# Patient Record
Sex: FEMALE | Race: AMERICAN INDIAN OR ALASKA NATIVE | Employment: UNEMPLOYED | URBAN - METROPOLITAN AREA
[De-identification: names, ages, dates, MRNs, and addresses within clinical notes are randomized per-mention and may not be internally consistent; named-entity substitution may affect disease eponyms.]

---

## 2017-02-14 ENCOUNTER — HOSPITAL ENCOUNTER (EMERGENCY)
Facility: CLINIC | Age: 52
Discharge: HOME | End: 2017-02-15
Attending: EMERGENCY MEDICINE | Admitting: EMERGENCY MEDICINE
Payer: COMMERCIAL

## 2017-02-14 ENCOUNTER — APPOINTMENT (OUTPATIENT)
Dept: CT IMAGING | Facility: CLINIC | Age: 52
End: 2017-02-14
Attending: EMERGENCY MEDICINE
Payer: COMMERCIAL

## 2017-02-14 DIAGNOSIS — F10.120 ALCOHOL ABUSE WITH UNCOMPLICATED INTOXICATION (H): ICD-10-CM

## 2017-02-14 DIAGNOSIS — E87.6 HYPOKALEMIA: ICD-10-CM

## 2017-02-14 DIAGNOSIS — E83.42 HYPOMAGNESEMIA: ICD-10-CM

## 2017-02-14 DIAGNOSIS — F10.10 ALCOHOL ABUSE: ICD-10-CM

## 2017-02-14 LAB — ALCOHOL BREATH TEST: 0.36 (ref 0–0.01)

## 2017-02-14 PROCEDURE — 83735 ASSAY OF MAGNESIUM: CPT | Performed by: EMERGENCY MEDICINE

## 2017-02-14 PROCEDURE — 85025 COMPLETE CBC W/AUTO DIFF WBC: CPT | Performed by: EMERGENCY MEDICINE

## 2017-02-14 PROCEDURE — 82075 ASSAY OF BREATH ETHANOL: CPT

## 2017-02-14 PROCEDURE — 70450 CT HEAD/BRAIN W/O DYE: CPT

## 2017-02-14 PROCEDURE — 99285 EMERGENCY DEPT VISIT HI MDM: CPT | Mod: 25

## 2017-02-14 PROCEDURE — 80048 BASIC METABOLIC PNL TOTAL CA: CPT | Performed by: EMERGENCY MEDICINE

## 2017-02-14 PROCEDURE — 99284 EMERGENCY DEPT VISIT MOD MDM: CPT | Mod: Z6 | Performed by: EMERGENCY MEDICINE

## 2017-02-14 PROCEDURE — 96361 HYDRATE IV INFUSION ADD-ON: CPT | Mod: 59

## 2017-02-14 PROCEDURE — 25000128 H RX IP 250 OP 636: Performed by: EMERGENCY MEDICINE

## 2017-02-14 RX ADMIN — SODIUM CHLORIDE 1000 ML: 9 INJECTION, SOLUTION INTRAVENOUS at 23:54

## 2017-02-14 ASSESSMENT — ENCOUNTER SYMPTOMS
ABDOMINAL PAIN: 0
HEADACHES: 0
NECK STIFFNESS: 0
CONFUSION: 0
COLOR CHANGE: 0
ARTHRALGIAS: 0
FEVER: 0
DIFFICULTY URINATING: 0
EYE REDNESS: 0
SHORTNESS OF BREATH: 0

## 2017-02-14 NOTE — ED AVS SNAPSHOT
[facility], [place], Emergency Department    [address]    [facility] [address]    Phone:  [phone]    Fax:  [phone]                                       Kari Mackay   MRN: [id number]    Department:  [facility], Emergency Department   Date of Visit:  2/14/2017           After Visit Summary Signature Page     I have received my discharge instructions, and my questions have been answered. I have discussed any challenges I see with this plan with the nurse or doctor.    ..........................................................................................................................................  Patient/Patient Representative Signature      ..........................................................................................................................................  Patient Representative Print Name and Relationship to Patient    ..................................................               ................................................  Date                                            Time    ..........................................................................................................................................  Reviewed by Signature/Title    ...................................................              ..............................................  Date                                                            Time

## 2017-02-14 NOTE — ED AVS SNAPSHOT
" [facility], Emergency Department    [address]    [place] [address]    Phone:  [phone]    Fax:  [phone]                                       Kari Mackay   MRN: [id number]    Department:  [facility], Emergency Department   Date of Visit:  2/14/2017           Patient Information     Date Of Birth          1965        Your diagnoses for this visit were:     Alcohol abuse     Hypomagnesemia     Hypokalemia        You were seen by Shabbir Wooten MD.        Discharge Instructions         Alcohol Abuse  Alcoholic drinks are harmful when you have too many of them. There is no set number of drinks that defines too much. Drinking that disrupts your life or your health is called alcohol abuse. Alcohol abuse can hurt your relationships with others. You may lose friends, a spouse, or even your job. You may be abusing alcohol if any of the following are true for you:    Duties at home or with  suffer because of drinking.    Duties at work or in school suffer because of drinking.    You have missed work or school because of drinking.    You use alcohol while driving or operating machinery.    You have legal problems such as arrests due to drinking.    You keep drinking even though it causes serious problems in your life.  Health effects  Alcohol abuse causes health problems. Sometimes this can happen after only drinking a  little.\" There is no set number of drinks or amount of alcohol that defines too much. The more you drink at one time, and the more often you drink determine both the short-term and long-term health effects. It affects all parts of your body and your health, including your:    Brain. Alcohol is a central nervous system depressant. It can damage parts of the brain that affect your balance, memory, thinking, and emotions. It can cause memory loss, blackouts, depression, agitation, sleep cycle changes, and seizures. These changes may or may not be reversible.    Heart " and vascular system. Alcohol affects multiple areas. It can damage heart muscle causing cardiomyopathy, which is a weakening and stretching of the heart muscle. This can lead to trouble breathing, an irregular heartbeat, atrial fibrillation, leg swelling, and heart failure. Alcohol use makes the blood vessels stiffen causing hypertension (high blood pressure). All of these problems increase your risk of having heart attacks or strokes.    Liver. Alcohol causes fat to build up in the liver, affecting its normal function. This increases the risk for hepatitis, leading to abdominal pain, appetite loss, jaundice, bleeding problems, liver fibrosis, and cirrhosis. This, in turn, can affect your ability to fight off infections, and can cause diabetes. The liver changes prevent it from removing toxins in your blood that can cause encephalopathy which may show with confusion, altered level of consciousness, personality changes, memory loss, seizures, coma, and death.    Pancreas. Alcohol can cause inflammation of the pancreas, or pancreatitis. This can cause abdominal pain, fever, and diabetes.    Immune system. Alcohol weakens your immune system in a number of ways. It suppresses your immune system making it harder to fight infections and colds. It also increases the chance of getting pneumonia and tuberculosis.    Cancer. Alcohol is a risk factor for developing cancer of the mouth, esophagus, pharynx, larynx, liver, and breast.    Sexual function. Alcohol can lead to sexual problems.  Home care  The following guidelines will help you deal with alcohol abuse:    Admit you have a problem with alcohol.    Ask for help from your health care provider and trusted family members or close friends.    Get help from people trained in dealing with alcohol abuse. This may be individual counseling or group therapy, or it may be a supervised alcohol treatment program.    Join a self-help group for alcohol abuse such as Alcoholics  Anonymous (AA).    Avoid people who abuse alcohol or tempt you to drink.  Follow-up care  Follow up as advised by the doctor or our staff. Contact these groups to get help:    Alcoholics Anonymous (AA): Go to www.aa.org or check the phone book for meetings near you.    National Alcohol and Substance Abuse Information Center (NASAI): 103.908.3857 www.[employer]Guides.co    National [place] on Alcoholism and Drug Dependence (NCADD): 419-DOW-XKCQ (513-5223) www.ncadd.org    Al-Anon: 378-3SD-IKSL (070-1545) www.al-anon.org  Call 911  Call 911 if any of these occur:    Trouble breathing or slow irregular breathing    Chest pain    Sudden weakness on one side of your body or sudden trouble speaking    Heavy bleeding or vomiting blood    Very drowsy or trouble awakening    Fainting or loss of consciousness    Rapid heart rate    Seizure  When to seek medical care  Get prompt medical attention if you have:    Confusion    Hallucinations (seeing, hearing, or feeling things that aren t there)    Pain in your upper abdomen that gets worse    Repeated vomiting or black or tarry stools    Severe shakiness    [id number] [employer]. [address] [place] [address]. All rights reserved. This information is not intended as a substitute for professional medical care. Always follow your healthcare professional's instructions.    Please make an appointment to follow up with Your Primary Care Provider.       Discharge References/Attachments     DIET: HIGH POTASSIUM, DISCHARGE INSTRUCTIONS (ENGLISH)      24 Hour Appointment Hotline       To make an appointment at any [facility], call [phone] ([phone]). If you don't have a family doctor or clinic, we will help you find one. [facility] clinics are conveniently located to serve the needs of you and your family.             Review of your medicines      Our records show that you are taking the medicines listed below. If these are incorrect,  "please call your family doctor or clinic.        Dose / Directions Last dose taken    multivitamin, therapeutic with minerals Tabs tablet   Dose:  1 tablet   Quantity:  30 each        Take 1 tablet by mouth daily   Refills:  0                Procedures and tests performed during your visit     Alcohol breath test POCT    Basic metabolic panel    CBC with platelets differential    Head CT w/o contrast    ISTAT gases elec ica gluc buddy POCT    Magnesium      Orders Needing Specimen Collection     None      Pending Results     No orders found for last 3 day(s).            Pending Culture Results     No orders found for last 3 day(s).            Thank you for choosing [facility]       Thank you for choosing [facility] for your care. Our goal is always to provide you with excellent care. Hearing back from our patients is one way we can continue to improve our services. Please take a few minutes to complete the written survey that you may receive in the mail after you visit with us. Thank you!        [email or web address] Information     [employer] lets you send messages to your doctor, view your test results, renew your prescriptions, schedule appointments and more. To sign up, go to [email or web address] . Click on \"Log in\" on the left side of the screen, which will take you to the Welcome page. Then click on \"Sign up Now\" on the right side of the page.     You will be asked to enter the access code listed below, as well as some personal information. Please follow the directions to create your username and password.     Your access code is: [email or web address]  Expires: 2017  6:33 AM     Your access code will  in 90 days. If you need help or a new code, please call your [facility] clinic or [phone].        Care EveryWhere ID     This is your Care EveryWhere ID. This could be used by other organizations to access your [facility] medical records  [id number]        After Visit Summary       This is your record. Keep this with you and " show to your community pharmacist(s) and doctor(s) at your next visit.

## 2017-02-15 VITALS
OXYGEN SATURATION: 94 % | SYSTOLIC BLOOD PRESSURE: 104 MMHG | HEART RATE: 87 BPM | DIASTOLIC BLOOD PRESSURE: 70 MMHG | RESPIRATION RATE: 14 BRPM | TEMPERATURE: 98.3 F

## 2017-02-15 LAB
ANION GAP SERPL CALCULATED.3IONS-SCNC: 10 MMOL/L (ref 3–14)
BASOPHILS # BLD AUTO: 0.1 10E9/L (ref 0–0.2)
BASOPHILS NFR BLD AUTO: 1.1 %
BUN SERPL-MCNC: 11 MG/DL (ref 7–30)
CA-I BLD-SCNC: 3.9 MG/DL (ref 4.4–5.2)
CALCIUM SERPL-MCNC: 7.6 MG/DL (ref 8.5–10.1)
CHLORIDE SERPL-SCNC: 105 MMOL/L (ref 94–109)
CO2 BLDCOV-SCNC: 25 MMOL/L (ref 21–28)
CO2 SERPL-SCNC: 29 MMOL/L (ref 20–32)
CREAT SERPL-MCNC: 0.78 MG/DL (ref 0.52–1.04)
DIFFERENTIAL METHOD BLD: ABNORMAL
EOSINOPHIL # BLD AUTO: 0.1 10E9/L (ref 0–0.7)
EOSINOPHIL NFR BLD AUTO: 1.3 %
ERYTHROCYTE [DISTWIDTH] IN BLOOD BY AUTOMATED COUNT: 16.7 % (ref 10–15)
GFR SERPL CREATININE-BSD FRML MDRD: 78 ML/MIN/1.7M2
GLUCOSE BLD-MCNC: 96 MG/DL (ref 70–99)
GLUCOSE SERPL-MCNC: 81 MG/DL (ref 70–99)
HCT VFR BLD AUTO: 24.7 % (ref 35–47)
HCT VFR BLD CALC: 24 %PCV (ref 35–47)
HGB BLD CALC-MCNC: 8.2 G/DL (ref 11.7–15.7)
HGB BLD-MCNC: 8.2 G/DL (ref 11.7–15.7)
IMM GRANULOCYTES # BLD: 0.1 10E9/L (ref 0–0.4)
IMM GRANULOCYTES NFR BLD: 1.9 %
LYMPHOCYTES # BLD AUTO: 1.9 10E9/L (ref 0.8–5.3)
LYMPHOCYTES NFR BLD AUTO: 35.4 %
MAGNESIUM SERPL-MCNC: 1.5 MG/DL (ref 1.6–2.3)
MCH RBC QN AUTO: 32.4 PG (ref 26.5–33)
MCHC RBC AUTO-ENTMCNC: 33.2 G/DL (ref 31.5–36.5)
MCV RBC AUTO: 98 FL (ref 78–100)
MONOCYTES # BLD AUTO: 0.4 10E9/L (ref 0–1.3)
MONOCYTES NFR BLD AUTO: 7.1 %
NEUTROPHILS # BLD AUTO: 2.9 10E9/L (ref 1.6–8.3)
NEUTROPHILS NFR BLD AUTO: 53.2 %
NRBC # BLD AUTO: 0 10*3/UL
NRBC BLD AUTO-RTO: 1 /100
PCO2 BLDV: 43 MM HG (ref 40–50)
PH BLDV: 7.38 PH (ref 7.32–7.43)
PLATELET # BLD AUTO: 114 10E9/L (ref 150–450)
PO2 BLDV: 61 MM HG (ref 25–47)
POTASSIUM BLD-SCNC: 3.7 MMOL/L (ref 3.4–5.3)
POTASSIUM SERPL-SCNC: 3.2 MMOL/L (ref 3.4–5.3)
RBC # BLD AUTO: 2.53 10E12/L (ref 3.8–5.2)
SAO2 % BLDV FROM PO2: 90 %
SODIUM BLD-SCNC: 146 MMOL/L (ref 133–144)
SODIUM SERPL-SCNC: 144 MMOL/L (ref 133–144)
WBC # BLD AUTO: 5.4 10E9/L (ref 4–11)

## 2017-02-15 PROCEDURE — 40000498 ZZHCL STATISTIC POTASSIUM ED POCT

## 2017-02-15 PROCEDURE — 25000128 H RX IP 250 OP 636: Performed by: EMERGENCY MEDICINE

## 2017-02-15 PROCEDURE — 25000125 ZZHC RX 250: Performed by: EMERGENCY MEDICINE

## 2017-02-15 PROCEDURE — 96365 THER/PROPH/DIAG IV INF INIT: CPT

## 2017-02-15 PROCEDURE — 82803 BLOOD GASES ANY COMBINATION: CPT

## 2017-02-15 PROCEDURE — 82330 ASSAY OF CALCIUM: CPT

## 2017-02-15 PROCEDURE — 40000501 ZZHCL STATISTIC HEMATOCRIT ED POCT

## 2017-02-15 PROCEDURE — 40000497 ZZHCL STATISTIC SODIUM ED POCT

## 2017-02-15 PROCEDURE — 40000502 ZZHCL STATISTIC GLUCOSE ED POCT

## 2017-02-15 PROCEDURE — 25000132 ZZH RX MED GY IP 250 OP 250 PS 637: Performed by: EMERGENCY MEDICINE

## 2017-02-15 RX ORDER — POTASSIUM CHLORIDE 1.5 G/1.58G
40 POWDER, FOR SOLUTION ORAL ONCE
Status: COMPLETED | OUTPATIENT
Start: 2017-02-15 | End: 2017-02-15

## 2017-02-15 RX ADMIN — FOLIC ACID: 5 INJECTION, SOLUTION INTRAMUSCULAR; INTRAVENOUS; SUBCUTANEOUS at 01:20

## 2017-02-15 RX ADMIN — POTASSIUM CHLORIDE 40 MEQ: 1.5 POWDER, FOR SOLUTION ORAL at 00:56

## 2017-02-15 NOTE — ED NOTES
"Patient states that she does not know her medications other than \"I am supposed to be on two antibiotics for pneumonia\"  "

## 2017-02-15 NOTE — ED PROVIDER NOTES
History     Chief Complaint   Patient presents with     Alcohol Intoxication     ETOH intoxication, residential facility reported seizure activity     Seizures     appears to be pseudoseizure, jerking lasting several seconds, patient able to recall what was happening.      HPI  Kari Mackay is a 51 year old female who presents to the emergency department with alcohol intoxication.  The patient presents via EMS.  The patient was reportedly picked up at a residential facility.  EMS was informed that the patient had a shaking episode while standing this evening.  The patient reportedly told the nurse that she recall this event and was awake and alert throughout it.  The patient states she does not recall any shaking event to me.  She was not exhibiting any postictal-type symptoms to EMS.  She denies any recent fall or injury.  She reports daily vodka use.  She states that the amount consumed depends upon who she is with an what they have available.  Patient states that she is currently on antibiotics for pneumonia.  She denies ongoing fevers.  She denies current chest pain and dyspnea.  She has abdominal pain.  She denies nausea and vomiting. The patient denies any mental health concerns.  She denies suicide ideations.    I have reviewed the Medications, Allergies, Past Medical and Surgical History, and Social History in the Epic system.    Review of Systems   Constitutional: Negative for fever.   HENT: Negative for congestion.    Eyes: Negative for redness.   Respiratory: Negative for shortness of breath.    Cardiovascular: Negative for chest pain.   Gastrointestinal: Negative for abdominal pain.   Genitourinary: Negative for difficulty urinating.   Musculoskeletal: Negative for arthralgias and neck stiffness.   Skin: Negative for color change.   Neurological: Negative for headaches.   Psychiatric/Behavioral: Negative for confusion.   All other systems reviewed and are negative.      Physical Exam   BP:  106/80  Pulse: 87  Heart Rate: 81  Temp: (S)  (PHOEBE)  Resp: 14  Weight:  (PHOEBE)  SpO2: 96 %  Physical Exam   Constitutional: She appears well-developed and well-nourished. No distress.   HENT:   Head: Normocephalic and atraumatic.   Mouth/Throat: Oropharynx is clear and moist. No oropharyngeal exudate.   Eyes: Pupils are equal, round, and reactive to light. No scleral icterus.   Neck: Normal range of motion.   Cardiovascular: Normal rate, regular rhythm, normal heart sounds and intact distal pulses.    Pulmonary/Chest: Effort normal and breath sounds normal. No respiratory distress.   Abdominal: Soft. Bowel sounds are normal. There is no tenderness.   Musculoskeletal: Normal range of motion. She exhibits no edema or tenderness.   Neurological: She is alert. She has normal strength. No cranial nerve deficit. Coordination abnormal.   Skin: Skin is warm. No rash noted. She is not diaphoretic.   Psychiatric: Her speech is slurred. She expresses no suicidal ideation.   Nursing note and vitals reviewed.      ED Course     ED Course     Procedures            Critical Care time:    Results for orders placed or performed during the hospital encounter of 02/14/17   Head CT w/o contrast    Narrative    CT HEAD W/O CONTRAST  2/15/2017 12:10 AM     HISTORY: Intoxicated, seizure.    TECHNIQUE: Axial images of the head and coronal reformations without  IV contrast material. Radiation dose for this scan was reduced using  automated exposure control, adjustment of the mA and/or kV according  to patient size, or iterative reconstruction technique.    COMPARISON: None.    FINDINGS: No intracranial hemorrhage, mass or mass effect. No acute  infarct identified. No shift of midline structures. The ventricles are  symmetric. Mild basal ganglia calcifications. No skull fractures. Mild  mucosal thickening involving maxillary sinuses, greater on the right,  and a few ethmoid air cells.      Impression    IMPRESSION: No acute intracranial  findings.   CBC with platelets differential   Result Value Ref Range    WBC 5.4 4.0 - 11.0 10e9/L    RBC Count 2.53 (L) 3.8 - 5.2 10e12/L    Hemoglobin 8.2 (L) 11.7 - 15.7 g/dL    Hematocrit 24.7 (L) 35.0 - 47.0 %    MCV 98 78 - 100 fl    MCH 32.4 26.5 - 33.0 pg    MCHC 33.2 31.5 - 36.5 g/dL    RDW 16.7 (H) 10.0 - 15.0 %    Platelet Count 114 (L) 150 - 450 10e9/L    Diff Method Automated Method     % Neutrophils 53.2 %    % Lymphocytes 35.4 %    % Monocytes 7.1 %    % Eosinophils 1.3 %    % Basophils 1.1 %    % Immature Granulocytes 1.9 %    Nucleated RBCs 1 (H) 0 /100    Absolute Neutrophil 2.9 1.6 - 8.3 10e9/L    Absolute Lymphocytes 1.9 0.8 - 5.3 10e9/L    Absolute Monocytes 0.4 0.0 - 1.3 10e9/L    Absolute Eosinophils 0.1 0.0 - 0.7 10e9/L    Absolute Basophils 0.1 0.0 - 0.2 10e9/L    Abs Immature Granulocytes 0.1 0 - 0.4 10e9/L    Absolute Nucleated RBC 0.0    Basic metabolic panel   Result Value Ref Range    Sodium 144 133 - 144 mmol/L    Potassium 3.2 (L) 3.4 - 5.3 mmol/L    Chloride 105 94 - 109 mmol/L    Carbon Dioxide 29 20 - 32 mmol/L    Anion Gap 10 3 - 14 mmol/L    Glucose 81 70 - 99 mg/dL    Urea Nitrogen 11 7 - 30 mg/dL    Creatinine 0.78 0.52 - 1.04 mg/dL    GFR Estimate 78 >60 mL/min/1.7m2    GFR Estimate If Black >90   GFR Calc   >60 mL/min/1.7m2    Calcium 7.6 (L) 8.5 - 10.1 mg/dL   Magnesium   Result Value Ref Range    Magnesium 1.5 (L) 1.6 - 2.3 mg/dL   Alcohol breath test POCT   Result Value Ref Range    Alcohol Breath Test 0.356 (A) 0.00 - 0.01        Medications   NaCl 0.9 % 1,000 mL with multivitamin-ADULT (INFUVITE) 10 mL, thiamine 100 mg, folic acid 1 mg, magnesium sulfate 2 g infusion (not administered)   0.9% sodium chloride BOLUS (0 mLs Intravenous Stopped 2/15/17 0056)   potassium chloride (KLOR-CON) Packet 40 mEq (40 mEq Oral Given 2/15/17 0056)        Assessments & Plan (with Medical Decision Making)   51 year old female to the emergency department with alcohol  intoxication.  EMS reports the patient had a shaking episode while standing at the wet house.  According the care everywhere, the patient has been seen at [facility] previously for similar episodes.  There has been no evidence for seizure activity previously.  I have low suspicion today as well.  In the emergency department, the patient is quite intoxicated.  The patient is anemic but her hemoglobin is stable over the past 6 months according to a care everywhere review.  She denies any bleeding.  The patient is hypomagnesemic and hypokalemic.  Banana bag given.  Oral potassium supplementation given.  The patient will be allowed to sober.  Anticipate discharge to home at that time.    I have reviewed the nursing notes.    I have reviewed the findings, diagnosis, plan and need for follow up with the patient.    New Prescriptions    No medications on file       Final diagnoses:   Alcohol abuse   Hypomagnesemia   Hypokalemia       2/14/2017   [facility], [place], EMERGENCY DEPARTMENT     Shabbir Wooten MD  02/15/17 0100

## 2017-02-15 NOTE — ED NOTES
As pt sleeps her O2 saturation drops to about 84. Pt is presently on 2L of O2 NC. O2 at 92 and above.

## 2017-02-15 NOTE — DISCHARGE INSTRUCTIONS
"  Alcohol Abuse  Alcoholic drinks are harmful when you have too many of them. There is no set number of drinks that defines too much. Drinking that disrupts your life or your health is called alcohol abuse. Alcohol abuse can hurt your relationships with others. You may lose friends, a spouse, or even your job. You may be abusing alcohol if any of the following are true for you:    Duties at home or with  suffer because of drinking.    Duties at work or in school suffer because of drinking.    You have missed work or school because of drinking.    You use alcohol while driving or operating machinery.    You have legal problems such as arrests due to drinking.    You keep drinking even though it causes serious problems in your life.  Health effects  Alcohol abuse causes health problems. Sometimes this can happen after only drinking a  little.\" There is no set number of drinks or amount of alcohol that defines too much. The more you drink at one time, and the more often you drink determine both the short-term and long-term health effects. It affects all parts of your body and your health, including your:    Brain. Alcohol is a central nervous system depressant. It can damage parts of the brain that affect your balance, memory, thinking, and emotions. It can cause memory loss, blackouts, depression, agitation, sleep cycle changes, and seizures. These changes may or may not be reversible.    Heart and vascular system. Alcohol affects multiple areas. It can damage heart muscle causing cardiomyopathy, which is a weakening and stretching of the heart muscle. This can lead to trouble breathing, an irregular heartbeat, atrial fibrillation, leg swelling, and heart failure. Alcohol use makes the blood vessels stiffen causing hypertension (high blood pressure). All of these problems increase your risk of having heart attacks or strokes.    Liver. Alcohol causes fat to build up in the liver, affecting its normal " function. This increases the risk for hepatitis, leading to abdominal pain, appetite loss, jaundice, bleeding problems, liver fibrosis, and cirrhosis. This, in turn, can affect your ability to fight off infections, and can cause diabetes. The liver changes prevent it from removing toxins in your blood that can cause encephalopathy which may show with confusion, altered level of consciousness, personality changes, memory loss, seizures, coma, and death.    Pancreas. Alcohol can cause inflammation of the pancreas, or pancreatitis. This can cause abdominal pain, fever, and diabetes.    Immune system. Alcohol weakens your immune system in a number of ways. It suppresses your immune system making it harder to fight infections and colds. It also increases the chance of getting pneumonia and tuberculosis.    Cancer. Alcohol is a risk factor for developing cancer of the mouth, esophagus, pharynx, larynx, liver, and breast.    Sexual function. Alcohol can lead to sexual problems.  Home care  The following guidelines will help you deal with alcohol abuse:    Admit you have a problem with alcohol.    Ask for help from your health care provider and trusted family members or close friends.    Get help from people trained in dealing with alcohol abuse. This may be individual counseling or group therapy, or it may be a supervised alcohol treatment program.    Join a self-help group for alcohol abuse such as Alcoholics Anonymous (AA).    Avoid people who abuse alcohol or tempt you to drink.  Follow-up care  Follow up as advised by the doctor or our staff. Contact these groups to get help:    Alcoholics Anonymous (AA): Go to www.aa.org or check the phone book for meetings near you.    National Alcohol and Substance Abuse Information Center (NASAIC): 302.202.5317 www.addictioncareoptions.com    National [place] on Alcoholism and Drug Dependence (NCADD): 693-RYW-LTQW (893-4033) www.ncadd.org    Al-Anon: 709-6XV-QBXL (093-1340)  www.al-anon.org  Call 911  Call 911 if any of these occur:    Trouble breathing or slow irregular breathing    Chest pain    Sudden weakness on one side of your body or sudden trouble speaking    Heavy bleeding or vomiting blood    Very drowsy or trouble awakening    Fainting or loss of consciousness    Rapid heart rate    Seizure  When to seek medical care  Get prompt medical attention if you have:    Confusion    Hallucinations (seeing, hearing, or feeling things that aren t there)    Pain in your upper abdomen that gets worse    Repeated vomiting or black or tarry stools    Severe shakiness    [id number] [employer]. [address] [place] [address]. All rights reserved. This information is not intended as a substitute for professional medical care. Always follow your healthcare professional's instructions.    Please make an appointment to follow up with Your Primary Care Provider.

## 2017-02-15 NOTE — ED NOTES
Bed: ED07  Expected date:   Expected time:   Means of arrival:   Comments:  Shannen 411 52 y/o ETOH

## 2018-05-28 ENCOUNTER — HOSPITAL ENCOUNTER (EMERGENCY)
Facility: CLINIC | Age: 53
Discharge: HOME | End: 2018-05-29
Attending: FAMILY MEDICINE | Admitting: FAMILY MEDICINE
Payer: COMMERCIAL

## 2018-05-28 DIAGNOSIS — F10.220 ACUTE ALCOHOLIC INTOXICATION IN ALCOHOLISM WITHOUT COMPLICATION (H): ICD-10-CM

## 2018-05-28 LAB
ALCOHOL BREATH TEST: 0.24 (ref 0–0.01)
AMPHETAMINES UR QL SCN: NEGATIVE
BARBITURATES UR QL: NEGATIVE
BENZODIAZ UR QL: NEGATIVE
CANNABINOIDS UR QL SCN: NEGATIVE
COCAINE UR QL: NEGATIVE
ETHANOL UR QL SCN: POSITIVE
OPIATES UR QL SCN: NEGATIVE

## 2018-05-28 PROCEDURE — 80320 DRUG SCREEN QUANTALCOHOLS: CPT | Performed by: FAMILY MEDICINE

## 2018-05-28 PROCEDURE — 82075 ASSAY OF BREATH ETHANOL: CPT | Performed by: FAMILY MEDICINE

## 2018-05-28 PROCEDURE — 99284 EMERGENCY DEPT VISIT MOD MDM: CPT | Mod: Z6 | Performed by: FAMILY MEDICINE

## 2018-05-28 PROCEDURE — 99285 EMERGENCY DEPT VISIT HI MDM: CPT | Performed by: FAMILY MEDICINE

## 2018-05-28 PROCEDURE — 80307 DRUG TEST PRSMV CHEM ANLYZR: CPT | Performed by: FAMILY MEDICINE

## 2018-05-28 RX ORDER — LORATADINE 10 MG/1
10 TABLET ORAL DAILY
Status: ON HOLD | COMMUNITY
End: 2018-07-02

## 2018-05-28 NOTE — ED AVS SNAPSHOT
[facility], Emergency Department    [address]    [place] [address]    Phone:  [phone]    Fax:  [phone]                                       Kari Mackay   MRN: [id number]    Department:  [facility], Emergency Department   Date of Visit:  5/28/2018           Patient Information     Date Of Birth          1965        Your diagnoses for this visit were:     Acute alcoholic intoxication in alcoholism without complication (H)        You were seen by Brice Hatch MD.        Discharge Instructions       Thank you for choosing [facility].     Please closely monitor for further symptoms. Return to the Emergency Department if you develop any new or worsening signs or symptoms.    If you received any opiate pain medications or sedatives during your visit, please do not drive for at least 8 hours.     Labs, cultures or final xray interpretations may still need to be reviewed.  We will call you if your plan of care needs to be changed.    Please follow up with your primary care physician or clinic.  To check the status of detox bed availability at [facility] call:  [phone]  To check the status of detox availability at [facility] call:  [phone]  To check the status of detox bed availability at [address] [place] [address] call:  [phone]  If you desire chemical dependency assessment or counseling, follow up with [facility]: [phone]      Discharge References/Attachments     ALCOHOL INTOXICATION (ENGLISH)    GETTING HELP, ALCOHOLISM (ENGLISH)      24 Hour Appointment Hotline       To make an appointment at any [facility] clinic, call [phone] ([phone]). If you don't have a family doctor or clinic, we will help you find one. [facility] clinics are conveniently located to serve the needs of you and your family.             Review of your medicines      Our records show that you are taking the medicines listed below. If these are  "incorrect, please call your family doctor or clinic.        Dose / Directions Last dose taken    loratadine 10 MG tablet   Commonly known as:  CLARITIN   Dose:  10 mg        Take 10 mg by mouth daily   Refills:  0        multivitamin, therapeutic with minerals Tabs tablet   Dose:  1 tablet   Quantity:  30 each        Take 1 tablet by mouth daily   Refills:  0                Procedures and tests performed during your visit     Alcohol breath test POCT    Drug abuse screen 6 urine (chem dep) ([facility])      Orders Needing Specimen Collection     None      Pending Results     No orders found for last 3 day(s).            Pending Culture Results     No orders found for last 3 day(s).            Pending Results Instructions     If you had any lab results that were not finalized at the time of your Discharge, you can call the ED Lab Result RN at [phone]. You will be contacted by this team for any positive Lab results or changes in treatment. The nurses are available 7 days a week from 10A to 6:30P.  You can leave a message 24 hours per day and they will return your call.        Thank you for choosing [facility]       Thank you for choosing [facility] for your care. Our goal is always to provide you with excellent care. Hearing back from our patients is one way we can continue to improve our services. Please take a few minutes to complete the written survey that you may receive in the mail after you visit with us. Thank you!        [employer] Information     [employer] lets you send messages to your doctor, view your test results, renew your prescriptions, schedule appointments and more. To sign up, go to [email or web address] . Click on \"Log in\" on the left side of the screen, which will take you to the Welcome page. Then click on \"Sign up Now\" on the right side of the page.     You will be asked to enter the access code listed below, as well as some personal information. Please follow the directions to create your username and " password.     Your access code is: [email or web address]  Expires: 2018  6:24 AM     Your access code will  in 90 days. If you need help or a new code, please call your [facility] clinic or [phone].        Care EveryWhere ID     This is your Care EveryWhere ID. This could be used by other organizations to access your [facility] medical records  [id number]        Equal Access to Services     JAMAAL SKELTON : Abilio bobo Sojana, waaxda luqadaha, qaybta kaalmada adeegyada, sony barry . So [facility] [phone].    ATENCIÓN: Si habla español, tiene a vazquez disposición servicios gratuitos de asistencia lingüística. Llame al [phone].    We comply with applicable federal civil rights laws and [place] laws. We do not discriminate on the basis of race, color, national origin, age, disability, sex, sexual orientation, or gender identity.            After Visit Summary       This is your record. Keep this with you and show to your community pharmacist(s) and doctor(s) at your next visit.

## 2018-05-28 NOTE — ED AVS SNAPSHOT
[facility], [place], Emergency Department    [address]    [facility] [address]    Phone:  [phone]    Fax:  [phone]                                       Kari Mackay   MRN: [id number]    Department:  [facility], Emergency Department   Date of Visit:  5/28/2018           After Visit Summary Signature Page     I have received my discharge instructions, and my questions have been answered. I have discussed any challenges I see with this plan with the nurse or doctor.    ..........................................................................................................................................  Patient/Patient Representative Signature      ..........................................................................................................................................  Patient Representative Print Name and Relationship to Patient    ..................................................               ................................................  Date                                            Time    ..........................................................................................................................................  Reviewed by Signature/Title    ...................................................              ..............................................  Date                                                            Time

## 2018-05-28 NOTE — ED PROVIDER NOTES
History   No chief complaint on file.    HPI  Kari Mackay is a 52 year old female who presents due to alcohol intoxication.  Patient has a known history of alcohol abuse and dependence, osteoarthritis, questionable seizure disorder versus pseudoseizures.  She reportedly lives in the sober housing at [facility].  She states she left this weekend and was binge drinking.  I see from the records she was at [facility] for alcohol intoxication 2 out of 3 nights this weekend.  Today she attempted to go back to [facility], but because she was intoxicated could not enter the sober housing.  The detox facility there was full and so they had her transported here via EMS.  She denies any trauma or injury.  She states her last drink was several hours ago when she was drinking vodka.  She estimates she drank 2-3 L over the weekend.  She denies any drug use.  She is requesting food.  No other complaints.    I have reviewed the Medications, Allergies, Past Medical and Surgical History, and Social History in the Epic system.    Review of Systems  All other systems were reviewed and are negative    Physical Exam   BP: 126/86  Pulse: 115  Temp: 98.3  F (36.8  C)  Resp: 16  SpO2: 100 %      Physical Exam   Constitutional: She is oriented to person, place, and time. She appears well-developed and well-nourished. No distress (Smells of alcohol, obviously intoxicated).   HENT:   Head: Normocephalic and atraumatic.   Mouth/Throat: Oropharynx is clear and moist.   No evidence of any head trauma   Eyes: EOM are normal. Pupils are equal, round, and reactive to light.   Neck: Normal range of motion. Neck supple. No tracheal deviation present. No thyromegaly present.   Cardiovascular: Normal rate, regular rhythm, normal heart sounds and intact distal pulses.  Exam reveals no gallop and no friction rub.    No murmur heard.  Pulmonary/Chest: Effort normal and breath sounds normal. She exhibits no tenderness.   Abdominal: Soft. Bowel  sounds are normal. She exhibits no distension and no mass. There is no tenderness.   Musculoskeletal: She exhibits no edema or tenderness.   Neurological: She is alert and oriented to person, place, and time. No cranial nerve deficit or sensory deficit. Coordination normal.   Skin: Skin is warm and dry. No rash noted.   Psychiatric: She has a normal mood and affect. Her behavior is normal. Her speech is delayed and slurred (Consistent with elevated alcohol level).   Nursing note and vitals reviewed.      ED Course     ED Course     Procedures             Critical Care time:  none             Labs Ordered and Resulted from Time of ED Arrival Up to the Time of Departure from the ED   DRUG ABUSE SCREEN 6 CHEM DEP URINE ([facility]) - Abnormal; Notable for the following:        Result Value    Ethanol Qual Urine Positive (*)     All other components within normal limits   ALCOHOL BREATH TEST POCT - Abnormal; Notable for the following:     Alcohol Breath Test 0.243 (*)     All other components within normal limits            Assessments & Plan (with Medical Decision Making)   Patient with a history of alcohol abuse and dependence presents due to alcohol intoxication and inability to care for herself.  On arrival her speech is slurred and she is clearly intoxicated but she denies any trauma or medical complaints.  She has been seen and evaluated in a different emergency department twice within the last 72 hours under similar circumstances although it appears she is less intoxicated today than she was on those occasions.  No my differential diagnosis includes multiple causes of altered mental status including head trauma, substance abuse toxidrome, CNS infection, CNS neoplasm, psychiatric crisis, clearly the clinical picture fits most closely with recurrent alcohol abuse.  I see no indication for labs or further workup currently, will observe the patient and if she does not clear appropriately would consider further workup,  otherwise will sober in the emergency department until she is able to care for herself and can be discharged once clinically sober.  This is also the patient's desire.    I have reviewed the nursing notes.    I have reviewed the findings, diagnosis, plan and need for follow up with the patient.    New Prescriptions    No medications on file       Final diagnoses:   Acute alcoholic intoxication in alcoholism without complication (H)       5/28/2018   [facility], [place], EMERGENCY DEPARTMENT     Brice Hatch MD  05/28/18 3886

## 2018-05-29 VITALS
OXYGEN SATURATION: 100 % | RESPIRATION RATE: 16 BRPM | TEMPERATURE: 98.3 F | HEART RATE: 138 BPM | DIASTOLIC BLOOD PRESSURE: 116 MMHG | SYSTOLIC BLOOD PRESSURE: 157 MMHG

## 2018-05-29 NOTE — DISCHARGE INSTRUCTIONS
Thank you for choosing [facility].     Please closely monitor for further symptoms. Return to the Emergency Department if you develop any new or worsening signs or symptoms.    If you received any opiate pain medications or sedatives during your visit, please do not drive for at least 8 hours.     Labs, cultures or final xray interpretations may still need to be reviewed.  We will call you if your plan of care needs to be changed.    Please follow up with your primary care physician or clinic.  To check the status of detox bed availability at [facility] call:  [phone]  To check the status of detox availability at [facility] call:  [phone]  To check the status of detox bed availability at [address] [place] [address] call:  [phone]  If you desire chemical dependency assessment or counseling, follow up with [facility]: [phone]

## 2018-06-03 ENCOUNTER — APPOINTMENT (OUTPATIENT)
Dept: GENERAL RADIOLOGY | Facility: CLINIC | Age: 53
End: 2018-06-03
Attending: FAMILY MEDICINE
Payer: COMMERCIAL

## 2018-06-03 ENCOUNTER — HOSPITAL ENCOUNTER (EMERGENCY)
Facility: CLINIC | Age: 53
Discharge: HOME | End: 2018-06-04
Attending: FAMILY MEDICINE | Admitting: FAMILY MEDICINE
Payer: COMMERCIAL

## 2018-06-03 ENCOUNTER — APPOINTMENT (OUTPATIENT)
Dept: CT IMAGING | Facility: CLINIC | Age: 53
End: 2018-06-03
Attending: FAMILY MEDICINE
Payer: COMMERCIAL

## 2018-06-03 DIAGNOSIS — F10.229 ALCOHOL DEPENDENCE WITH INTOXICATION WITH COMPLICATION (H): ICD-10-CM

## 2018-06-03 LAB
ALBUMIN SERPL-MCNC: 3.7 G/DL (ref 3.4–5)
ALCOHOL BREATH TEST: 0.2 (ref 0–0.01)
ALCOHOL BREATH TEST: 0.39 (ref 0–0.01)
ALP SERPL-CCNC: 125 U/L (ref 40–150)
ALT SERPL W P-5'-P-CCNC: <6 U/L (ref 0–50)
AMPHETAMINES UR QL SCN: NEGATIVE
ANION GAP SERPL CALCULATED.3IONS-SCNC: 15 MMOL/L (ref 3–14)
AST SERPL W P-5'-P-CCNC: 55 U/L (ref 0–45)
BARBITURATES UR QL: NEGATIVE
BASOPHILS # BLD AUTO: 0 10E9/L (ref 0–0.2)
BASOPHILS NFR BLD AUTO: 0.3 %
BENZODIAZ UR QL: NEGATIVE
BILIRUB SERPL-MCNC: 0.5 MG/DL (ref 0.2–1.3)
BUN SERPL-MCNC: 10 MG/DL (ref 7–30)
CALCIUM SERPL-MCNC: 7.4 MG/DL (ref 8.5–10.1)
CANNABINOIDS UR QL SCN: NEGATIVE
CHLORIDE SERPL-SCNC: 107 MMOL/L (ref 94–109)
CO2 SERPL-SCNC: 25 MMOL/L (ref 20–32)
COCAINE UR QL: NEGATIVE
CREAT SERPL-MCNC: 0.37 MG/DL (ref 0.52–1.04)
DIFFERENTIAL METHOD BLD: ABNORMAL
EOSINOPHIL # BLD AUTO: 0.1 10E9/L (ref 0–0.7)
EOSINOPHIL NFR BLD AUTO: 0.7 %
ERYTHROCYTE [DISTWIDTH] IN BLOOD BY AUTOMATED COUNT: 15.9 % (ref 10–15)
ETHANOL SERPL-MCNC: 0.38 G/DL
ETHANOL UR QL SCN: POSITIVE
GFR SERPL CREATININE-BSD FRML MDRD: >90 ML/MIN/1.7M2
GLUCOSE SERPL-MCNC: 100 MG/DL (ref 70–99)
HCT VFR BLD AUTO: 33.9 % (ref 35–47)
HGB BLD-MCNC: 11 G/DL (ref 11.7–15.7)
IMM GRANULOCYTES # BLD: 0 10E9/L (ref 0–0.4)
IMM GRANULOCYTES NFR BLD: 0.4 %
LIPASE SERPL-CCNC: 261 U/L (ref 73–393)
LYMPHOCYTES # BLD AUTO: 1.5 10E9/L (ref 0.8–5.3)
LYMPHOCYTES NFR BLD AUTO: 20 %
MAGNESIUM SERPL-MCNC: 1.6 MG/DL (ref 1.6–2.3)
MCH RBC QN AUTO: 28.9 PG (ref 26.5–33)
MCHC RBC AUTO-ENTMCNC: 32.4 G/DL (ref 31.5–36.5)
MCV RBC AUTO: 89 FL (ref 78–100)
MONOCYTES # BLD AUTO: 0.2 10E9/L (ref 0–1.3)
MONOCYTES NFR BLD AUTO: 2.9 %
NEUTROPHILS # BLD AUTO: 5.6 10E9/L (ref 1.6–8.3)
NEUTROPHILS NFR BLD AUTO: 75.7 %
NRBC # BLD AUTO: 0 10*3/UL
NRBC BLD AUTO-RTO: 0 /100
OPIATES UR QL SCN: NEGATIVE
PLATELET # BLD AUTO: 153 10E9/L (ref 150–450)
POTASSIUM SERPL-SCNC: 3.4 MMOL/L (ref 3.4–5.3)
PROT SERPL-MCNC: 8 G/DL (ref 6.8–8.8)
RBC # BLD AUTO: 3.81 10E12/L (ref 3.8–5.2)
SODIUM SERPL-SCNC: 147 MMOL/L (ref 133–144)
WBC # BLD AUTO: 7.4 10E9/L (ref 4–11)

## 2018-06-03 PROCEDURE — 83690 ASSAY OF LIPASE: CPT | Performed by: FAMILY MEDICINE

## 2018-06-03 PROCEDURE — 25000128 H RX IP 250 OP 636: Performed by: FAMILY MEDICINE

## 2018-06-03 PROCEDURE — 96361 HYDRATE IV INFUSION ADD-ON: CPT | Performed by: FAMILY MEDICINE

## 2018-06-03 PROCEDURE — 99284 EMERGENCY DEPT VISIT MOD MDM: CPT | Mod: Z6 | Performed by: FAMILY MEDICINE

## 2018-06-03 PROCEDURE — 96365 THER/PROPH/DIAG IV INF INIT: CPT | Performed by: FAMILY MEDICINE

## 2018-06-03 PROCEDURE — 25000125 ZZHC RX 250: Performed by: FAMILY MEDICINE

## 2018-06-03 PROCEDURE — 83735 ASSAY OF MAGNESIUM: CPT | Performed by: FAMILY MEDICINE

## 2018-06-03 PROCEDURE — 85025 COMPLETE CBC W/AUTO DIFF WBC: CPT | Performed by: FAMILY MEDICINE

## 2018-06-03 PROCEDURE — 80307 DRUG TEST PRSMV CHEM ANLYZR: CPT | Performed by: FAMILY MEDICINE

## 2018-06-03 PROCEDURE — 82075 ASSAY OF BREATH ETHANOL: CPT | Performed by: FAMILY MEDICINE

## 2018-06-03 PROCEDURE — 80320 DRUG SCREEN QUANTALCOHOLS: CPT | Performed by: FAMILY MEDICINE

## 2018-06-03 PROCEDURE — 82075 ASSAY OF BREATH ETHANOL: CPT | Mod: 91 | Performed by: FAMILY MEDICINE

## 2018-06-03 PROCEDURE — 25000132 ZZH RX MED GY IP 250 OP 250 PS 637: Performed by: FAMILY MEDICINE

## 2018-06-03 PROCEDURE — 80053 COMPREHEN METABOLIC PANEL: CPT | Performed by: FAMILY MEDICINE

## 2018-06-03 PROCEDURE — 25800025 ZZH RX 258: Performed by: FAMILY MEDICINE

## 2018-06-03 PROCEDURE — 70450 CT HEAD/BRAIN W/O DYE: CPT

## 2018-06-03 PROCEDURE — 73502 X-RAY EXAM HIP UNI 2-3 VIEWS: CPT

## 2018-06-03 PROCEDURE — 71046 X-RAY EXAM CHEST 2 VIEWS: CPT

## 2018-06-03 PROCEDURE — 96375 TX/PRO/DX INJ NEW DRUG ADDON: CPT | Performed by: FAMILY MEDICINE

## 2018-06-03 PROCEDURE — 99285 EMERGENCY DEPT VISIT HI MDM: CPT | Mod: 25 | Performed by: FAMILY MEDICINE

## 2018-06-03 RX ORDER — ATENOLOL 50 MG/1
50 TABLET ORAL ONCE
Status: COMPLETED | OUTPATIENT
Start: 2018-06-03 | End: 2018-06-03

## 2018-06-03 RX ORDER — ONDANSETRON 2 MG/ML
4 INJECTION INTRAMUSCULAR; INTRAVENOUS ONCE
Status: COMPLETED | OUTPATIENT
Start: 2018-06-03 | End: 2018-06-03

## 2018-06-03 RX ADMIN — SODIUM CHLORIDE 1000 ML: 900 INJECTION, SOLUTION INTRAVENOUS at 17:50

## 2018-06-03 RX ADMIN — FOLIC ACID: 5 INJECTION, SOLUTION INTRAMUSCULAR; INTRAVENOUS; SUBCUTANEOUS at 16:46

## 2018-06-03 RX ADMIN — ONDANSETRON 4 MG: 2 INJECTION INTRAMUSCULAR; INTRAVENOUS at 21:47

## 2018-06-03 RX ADMIN — ATENOLOL 50 MG: 50 TABLET ORAL at 21:50

## 2018-06-03 ASSESSMENT — ENCOUNTER SYMPTOMS
FEVER: 0
ABDOMINAL PAIN: 0
SHORTNESS OF BREATH: 0

## 2018-06-03 NOTE — DISCHARGE INSTRUCTIONS
Thank you for choosing [facility].     Please closely monitor for further symptoms. Return to the Emergency Department if you develop any new or worsening signs or symptoms.    If you received any opiate pain medications or sedatives during your visit, please do not drive for at least 8 hours.     Labs, cultures or final xray interpretations may still need to be reviewed.  We will call you if your plan of care needs to be changed.    Please follow up with your primary care physician or clinic.  To check the status of detox bed availability at [facility] call:  [phone]  To check the status of detox availability at [facility] call:  [phone]  To check the status of detox bed availability at [address] [place] [address] call:  [phone]  If you desire chemical dependency assessment or counseling, follow up with [facility]: [phone]

## 2018-06-03 NOTE — ED AVS SNAPSHOT
[facility], [place], Emergency Department    [address]    [facility] [address]    Phone:  [phone]    Fax:  [phone]                                       Kari Mackay   MRN: [id number]    Department:  [facility], Emergency Department   Date of Visit:  6/3/2018           After Visit Summary Signature Page     I have received my discharge instructions, and my questions have been answered. I have discussed any challenges I see with this plan with the nurse or doctor.    ..........................................................................................................................................  Patient/Patient Representative Signature      ..........................................................................................................................................  Patient Representative Print Name and Relationship to Patient    ..................................................               ................................................  Date                                            Time    ..........................................................................................................................................  Reviewed by Signature/Title    ...................................................              ..............................................  Date                                                            Time

## 2018-06-03 NOTE — ED PROVIDER NOTES
"  History     Chief Complaint   Patient presents with     Addiction Problem     seeking detox for ETOH abuse     HPI  Kari Mackay is a 52 year old female with history of arthritis, alcohol dependence, pseudoseizures, and homelessness who presents to the emergency department today for evaluation of an addiction problem.  The patient is brought in by her friend whom she calls \"Дмитрий Fraser\", who states that he was concerned because the patient has been drinking a substantially large amount.  The patient does admit that she has been drinking a large amount of vodka daily, last was sober around 2 weeks ago.  She desires detox at this time.  The patient utilizes a cane for knee arthritis.  She has some buttock soreness which she attributes to states is from  sitting on the bus extensively, but she otherwise has no physical complaints at this time.  She states that she is indeed homeless.  Later admits that she may have fallen sometime yesterday.    Current Facility-Administered Medications   Medication     0.9% sodium chloride BOLUS     dextrose 5% and 0.45% NaCl 1,000 mL with INFUVITE 10 mL, thiamine 100 mg, folic acid 1 mg infusion     Current Outpatient Prescriptions   Medication     loratadine (CLARITIN) 10 MG tablet     multivitamin, therapeutic with minerals (THERA-VIT-M) TABS     Past Medical History:   Diagnosis Date     Alcohol dependence with withdrawal (H)     multiple detox attempts     Osteoarthritis of right knee      Seizures (H)     alcohol withdrawal     Substance abuse        Past Surgical History:   Procedure Laterality Date     GALLBLADDER SURGERY       GYN SURGERY         No family history on file.    Social History   Substance Use Topics     Smoking status: Current Every Day Smoker     Packs/day: 0.25     Smokeless tobacco: Never Used     Alcohol use Yes      Comment: Daily 1-2 liters per day     Allergies   Allergen Reactions     Acetaminophen      Penicillins Other (See Comments)     Doesn't " know     Percocet [Oxycodone-Acetaminophen]        I have reviewed the Medications, Allergies, Past Medical and Surgical History, and Social History in the Epic system.    Review of Systems   Constitutional: Negative for fever.   Respiratory: Negative for shortness of breath.    Cardiovascular: Negative for chest pain.   Gastrointestinal: Negative for abdominal pain.   All other systems reviewed and are negative.      Physical Exam   BP: (!) 131/92  Pulse: 104  Temp: 97.5  F (36.4  C)  Resp: 18  SpO2: 98 %      Physical Exam   Constitutional: She is oriented to person, place, and time. She appears well-developed and well-nourished.   HENT:   Head: Normocephalic.       Mouth/Throat: Oropharynx is clear and moist.   Eyes: EOM are normal. Pupils are equal, round, and reactive to light.   Neck: Normal range of motion. Neck supple. No spinous process tenderness and no muscular tenderness present. No tracheal deviation present. No thyromegaly present.   Cardiovascular: Normal rate, regular rhythm, normal heart sounds and intact distal pulses.  Exam reveals no gallop and no friction rub.    No murmur heard.  Pulmonary/Chest: Effort normal and breath sounds normal. She exhibits no tenderness.   Abdominal: Soft. Bowel sounds are normal. She exhibits no distension and no mass. There is no tenderness.   Musculoskeletal: She exhibits no edema or tenderness.        Legs:  Distal CMS intact.   Neurological: She is alert and oriented to person, place, and time. She has normal strength. No cranial nerve deficit or sensory deficit. Coordination normal. GCS eye subscore is 4. GCS verbal subscore is 5. GCS motor subscore is 6.   Skin: Skin is warm and dry. No rash noted.   Psychiatric: She has a normal mood and affect. Her speech is slurred (Consistent with markedly elevated alcohol level). She is withdrawn. Thought content is not paranoid. Cognition and memory are impaired. She expresses no homicidal and no suicidal ideation.    Nursing note and vitals reviewed.      ED Course     ED Course     Procedures             Critical Care time:  none             Labs Ordered and Resulted from Time of ED Arrival Up to the Time of Departure from the ED   CBC WITH PLATELETS DIFFERENTIAL - Abnormal; Notable for the following:        Result Value    Hemoglobin 11.0 (*)     Hematocrit 33.9 (*)     RDW 15.9 (*)     All other components within normal limits   COMPREHENSIVE METABOLIC PANEL - Abnormal; Notable for the following:     Sodium 147 (*)     Anion Gap 15 (*)     Glucose 100 (*)     Creatinine 0.37 (*)     Calcium 7.4 (*)     AST 55 (*)     All other components within normal limits   ALCOHOL BREATH TEST POCT - Abnormal; Notable for the following:     Alcohol Breath Test 0.391 (*)     All other components within normal limits   DRUG ABUSE SCREEN 6 CHEM DEP URINE ([facility])   ALCOHOL ETHYL   LIPASE   MAGNESIUM            Assessments & Plan (with Medical Decision Making)   Patient with a long-standing history of alcoholism and alcohol dependence presents again to the emergency department due to acute alcohol intoxication.  Patient states she is not particularly interested in detox and also there are no detox beds available either here or in the community.  She has signs of minor trauma on her head and face and also on her left hip area.  Imaging of those areas is negative.  This trauma appears to have occurred at least a day or 2 ago based on the clinical appearance.  She is markedly intoxicated and so was provided IV fluids, banana bag, and allowed to sober some in the emergency department.  She will be observed until clinically sober and then can be discharged with outpatient CD resources.  She is admittedly very relapse prone but is not motivated at this time for detox or treatment and states her primary problem is with housing.  She declines a social work consult.  She will be signed out at shift change.    I have reviewed the nursing notes.    I  have reviewed the findings, diagnosis, plan and need for follow up with the patient.    New Prescriptions    No medications on file       Final diagnoses:   Alcohol dependence with intoxication with complication (H)       6/3/2018   [facility], [place], EMERGENCY DEPARTMENT     Brice Hatch MD  06/03/18 4081

## 2018-06-03 NOTE — ED AVS SNAPSHOT
[facility], Emergency Department    [address]    [place] [address]    Phone:  [phone]    Fax:  [phone]                                       Kari Mackay   MRN: [id number]    Department:  [facility], Emergency Department   Date of Visit:  6/3/2018           Patient Information     Date Of Birth          1965        Your diagnoses for this visit were:     Alcohol dependence with intoxication with complication (H)        You were seen by Brice Hatch MD and Louis Workman MD.        Discharge Instructions       Thank you for choosing [facility].     Please closely monitor for further symptoms. Return to the Emergency Department if you develop any new or worsening signs or symptoms.    If you received any opiate pain medications or sedatives during your visit, please do not drive for at least 8 hours.     Labs, cultures or final xray interpretations may still need to be reviewed.  We will call you if your plan of care needs to be changed.    Please follow up with your primary care physician or clinic.  To check the status of detox bed availability at [facility] call:  [phone]  To check the status of detox availability at [facility] call:  [phone]  To check the status of detox bed availability at [address] [place] [address] call:  [phone]  If you desire chemical dependency assessment or counseling, follow up with [facility]: [phone]      Discharge References/Attachments     ALCOHOL INTOXICATION (ENGLISH)    ADDICTION, RECOVERING: COPING WITH RELAPSE (ENGLISH)      24 Hour Appointment Hotline       To make an appointment at any [facility] clinic, call [phone] ([phone]). If you don't have a family doctor or clinic, we will help you find one. [facility] clinics are conveniently located to serve the needs of you and your family.             Review of your medicines      Our records show that you are taking the  medicines listed below. If these are incorrect, please call your family doctor or clinic.        Dose / Directions Last dose taken    loratadine 10 MG tablet   Commonly known as:  CLARITIN   Dose:  10 mg        Take 10 mg by mouth daily   Refills:  0        multivitamin, therapeutic with minerals Tabs tablet   Dose:  1 tablet   Quantity:  30 each        Take 1 tablet by mouth daily   Refills:  0                Procedures and tests performed during your visit     Procedure/Test Number of Times Performed    Alcohol breath test POCT 2    Alcohol level blood 1    CBC with platelets differential 1    Chest XR,  PA & LAT 1    Comprehensive metabolic panel 1    Drug abuse screen 6 urine (chem dep) ([facility]) 1    Head CT w/o contrast 1    Lipase 1    Magnesium 1    XR Pelvis w Hip Left G/E 2 Views 1      Orders Needing Specimen Collection     None      Pending Results     No orders found for last 3 day(s).            Pending Culture Results     No orders found for last 3 day(s).            Pending Results Instructions     If you had any lab results that were not finalized at the time of your Discharge, you can call the ED Lab Result RN at [phone]. You will be contacted by this team for any positive Lab results or changes in treatment. The nurses are available 7 days a week from 10A to 6:30P.  You can leave a message 24 hours per day and they will return your call.        Thank you for choosing [facility]       Thank you for choosing [facility] for your care. Our goal is always to provide you with excellent care. Hearing back from our patients is one way we can continue to improve our services. Please take a few minutes to complete the written survey that you may receive in the mail after you visit with us. Thank you!        [employer] Information     [employer] lets you send messages to your doctor, view your test results, renew your prescriptions, schedule appointments and more. To sign up, go to [email or web address] . Click  "on \"Log in\" on the left side of the screen, which will take you to the Welcome page. Then click on \"Sign up Now\" on the right side of the page.     You will be asked to enter the access code listed below, as well as some personal information. Please follow the directions to create your username and password.     Your access code is: [email or web address]  Expires: 2018  6:24 AM     Your access code will  in 90 days. If you need help or a new code, please call your [facility] clinic or [phone].        Care EveryWhere ID     This is your Care EveryWhere ID. This could be used by other organizations to access your [facility] medical records  [id number]        Equal Access to Services     JAMAAL SKELTON : Abilio Miranda, wabalta ponce, qapaz kaalivis tavarez, sony valencia. So [facility] [phone].    ATENCIÓN: Si habla español, tiene a vazquez disposición servicios gratuitos de asistencia lingüística. Llame al [phone].    We comply with applicable federal civil rights laws and [place] laws. We do not discriminate on the basis of race, color, national origin, age, disability, sex, sexual orientation, or gender identity.            After Visit Summary       This is your record. Keep this with you and show to your community pharmacist(s) and doctor(s) at your next visit.                  "

## 2018-06-04 VITALS
RESPIRATION RATE: 16 BRPM | OXYGEN SATURATION: 97 % | TEMPERATURE: 98.9 F | SYSTOLIC BLOOD PRESSURE: 132 MMHG | HEART RATE: 81 BPM | DIASTOLIC BLOOD PRESSURE: 73 MMHG

## 2018-06-04 NOTE — ED NOTES
Patient signed out to me by Dr. Hatch patient acutely intoxicated is not interested in detox at this time is requesting staying in the emergency room until sober.  Patient's tachycardia responded well to p.o. atenolol she was also given IV fluids and Zofran and was doing well .  Patient will be signed out to night staff and will be discharged when sober in the morning.     Louis Workman MD  06/04/18 0148

## 2018-06-04 NOTE — ED NOTES
.SIGN-OUT:  - Assumed care of this patient from Dr. Workman  - Pending at shift change:  Re-evaluate in the morning and discharge once sober  - Tentative plan: discharge home in the morning once clinically sober     REASSESSMENT:  - No acute issues or interventions necessary while still in the emergency department.     DISPOSITION:  - Patient carried on with their original disposition plan.       Oksana Rainey MD  06/04/18 0619

## 2018-06-09 ENCOUNTER — HOSPITAL ENCOUNTER (EMERGENCY)
Facility: CLINIC | Age: 53
Discharge: HOME | End: 2018-06-10
Attending: EMERGENCY MEDICINE | Admitting: EMERGENCY MEDICINE
Payer: COMMERCIAL

## 2018-06-09 DIAGNOSIS — F10.920 ALCOHOLIC INTOXICATION WITHOUT COMPLICATION (H): ICD-10-CM

## 2018-06-09 LAB
ALCOHOL BREATH TEST: 0.22 (ref 0–0.01)
ALCOHOL BREATH TEST: NORMAL (ref 0–0.01)

## 2018-06-09 PROCEDURE — 99285 EMERGENCY DEPT VISIT HI MDM: CPT | Performed by: EMERGENCY MEDICINE

## 2018-06-09 PROCEDURE — 82075 ASSAY OF BREATH ETHANOL: CPT | Performed by: EMERGENCY MEDICINE

## 2018-06-09 PROCEDURE — 99284 EMERGENCY DEPT VISIT MOD MDM: CPT | Mod: Z6 | Performed by: EMERGENCY MEDICINE

## 2018-06-09 NOTE — ED AVS SNAPSHOT
[facility], Emergency Department    [address]    [facility] [address]    Phone:  [phone]    Fax:  [phone]                                       Kari Mackay   MRN: [id number]    Department:  [facility], Emergency Department   Date of Visit:  6/9/2018           Patient Information     Date Of Birth          1965        Your diagnoses for this visit were:     Alcoholic intoxication without complication (H)        You were seen by Supriya Leblanc MD.      Follow-up Information     Follow up with Jackie Kendrick MD.    Contact information:    [facility]  [address]  [facility] [address] [phone]          Discharge Instructions         Alcohol Intoxication  Alcohol intoxication occurs when you drink alcohol faster than your liver can remove it from your system. The following facts are important to remember:    It can take 10 minutes or more to start to feel the effects of a drink, so you can easily get more intoxicated than you intended.    One drink may be more than 1 serving of alcohol. Depending on the drink, it can be 2 to 4 servings.    It takes about an hour for your body to metabolize (clear) 1 serving. If you have more than 1 drink, it can take a couple of hours or more.    Many things affect how drinks will affect you, including whether you ve eaten, how fast you drink, your size, how much you normally drink (or not), medicines you take, chronic diseases you have, and gender.  Signs and symptoms of alcohol poisoning  The following are signs and symptoms of alcohol poisoning:  Mild impairment    Reduced inhibitions    Slurred speech    Drowsiness    Decreased fine motor skills  Moderate impairment    Erratic behavior, aggression, depression    Impaired judgment    Confusion    Concentration difficulties    Coordination problems  Severe impairment    Vomiting    Seizures    Unconsciousness    Cold, clammy    Slow or irregular  "breathing    Hypothermia (low body temperature)    Coma  Health effects  Alcohol abuse causes health problems. Sometimes this can happen after only drinking a  little.\" There is no set number of drinks or amount of alcohol that defines too much. The more you drink at one time, and the more frequently you drink determine both the short-term and long-term health effects. It affects all parts of your body and your health, including your:    Brain. Alcohol is a central nervous system depressant. It can damage parts of the brain that affect your balance, memory, thinking, and emotions. It can cause memory loss, blackouts, depression, agitation, sleep cycle changes, and seizures. These changes may or may not be reversible.    Heart and vascular system. Alcohol affects multiple areas. It can damage heart muscle causing cardiomyopathy, which is a weakening and stretching of the heart muscle. This can lead to trouble breathing, an irregular heartbeat, atrial fibrillation, leg swelling, and heart failure. It makes the blood vessels stiffen causing hypertension (high blood pressure). All of these problems increase your risk of having heart attacks or strokes.    Liver. Alcohol causes fat to build up in the liver, affecting its normal function. This increases the risk for hepatitis, leading to abdominal pain, appetite loss, jaundice, bleeding problems, liver fibrosis, and cirrhosis. This in turn can affect your ability to fight off infections, and can cause diabetes. The liver changes prevent it from removing toxins in your blood that can cause encephalopathy. Signs of this are confusion, altered level of consciousness, personality changes, memory loss, seizures, coma, and death.    Pancreas. Alcohol can cause inflammation of the pancreas, or pancreatitis. This can cause pain in your abdomen, fever, and diabetes.    Immune system. Alcohol weakens your immune system in a number of ways. It suppresses your immune system making it " harder to fight off infections and colds. You will also have a higher risk of certain infections like pneumonia and tuberculosis.    Cancer risk. Alcohol raises your risk of cancer of the mouth, esophagus, pharynx, larynx, liver, and breast.    Sexual function. Alcohol abuse can also lead to sexual problems.  Alcohol use during pregnancy may cause permanent damage to the growing baby.  Home care  The following guidelines will help you care for yourself at home:    Don't drink any more alcohol.    Don't drive until all effects of the alcohol have worn off.    Don't operate machinery that can cause injuries.    Get lots of rest over the next few days. Drink plenty of water and other non-alcoholic liquids. Try to eat regular meals.    If you have been drinking heavily on a daily basis, you may go through alcohol withdrawal. The usual symptoms last 3 to 4 days and may include nervousness, shakiness, nausea, sweating, sleeplessness, and can even cause seizures and a serious withdrawal symptom called delirium tremens, or DTs. During this time, it is best that you stay with family or friends who can help and support you. You can also admit yourself to a residential detox program. If your symptoms are severe (seizures, severe shakiness, confusion), contact your doctor or call an ambulance for help (see below).   Follow-up care  If alcohol is a problem in your life, these are some organizations that can help you:    Alcoholics Anonymous offers support through a self-help fellowship. There are no dues or fees. See the Yellow Pages and call for time and place of meetings. Find AA online at www.aa.org.    [employer] offers support to families of alcohol users. Contact 285-488-5085, or online at www.al-anolenny.org.    National [employer] on Alcoholism and Drug Dependence can be reached at 194-396-6380, or online at www.ncadd.org.    There are also inpatient and residential alcohol detox programs. Check the Internet or phonebook Yellow  Pages under  Drug Abuse and Treatment Centers.   Call 911  Call 911 if any of these occur:    Trouble breathing or slow irregular breathing    Chest pain    Sudden weakness on one side of your body or sudden trouble speaking    Heavy bleeding or vomiting blood    Very drowsy or trouble awakening    Fainting or loss of consciousness    Rapid heart rate    Seizure  When to seek medical advice  Call your healthcare provider right away if any of these occur:    Severe shakiness     Fever of 100.4 F (38 C) or higher, or as directed by your healthcare provider    Confusion or hallucinations (seeing, hearing, or feeling things that are not there)    Pain in your upper abdomen that gets worse    Repeated vomiting  Date Last Reviewed: 6/1/2016 2000-2017 [employer]. [address] [place] [address], [place] [address]. All rights reserved. This information is not intended as a substitute for professional medical care. Always follow your healthcare professional's instructions.          24 Hour Appointment Hotline       To make an appointment at any [facility], call [phone] ([phone]). If you don't have a family doctor or clinic, we will help you find one. [facility] clinics are conveniently located to serve the needs of you and your family.             Review of your medicines      Our records show that you are taking the medicines listed below. If these are incorrect, please call your family doctor or clinic.        Dose / Directions Last dose taken    loratadine 10 MG tablet   Commonly known as:  CLARITIN   Dose:  10 mg        Take 10 mg by mouth daily   Refills:  0        multivitamin, therapeutic with minerals Tabs tablet   Dose:  1 tablet   Quantity:  30 each        Take 1 tablet by mouth daily   Refills:  0                Procedures and tests performed during your visit     Procedure/Test Number of Times Performed    Alcohol breath test POCT 2      Orders Needing Specimen Collection     Ordered     "      18 1647  Drug abuse screen 6 urine (chem dep) - STAT, [facility]: STAT, Needs to be Collected     Scheduled Task Status   18 1647 Collect Drug abuse screen 6 urine (chem dep) Open   Order Class:  PCU Collect                  Pending Results     No orders found for last 3 day(s).            Pending Culture Results     No orders found for last 3 day(s).            Pending Results Instructions     If you had any lab results that were not finalized at the time of your Discharge, you can call the ED Lab Result RN at [phone]. You will be contacted by this team for any positive Lab results or changes in treatment. The nurses are available 7 days a week from 10A to 6:30P.  You can leave a message 24 hours per day and they will return your call.        Thank you for choosing [facility]       Thank you for choosing [facility] for your care. Our goal is always to provide you with excellent care. Hearing back from our patients is one way we can continue to improve our services. Please take a few minutes to complete the written survey that you may receive in the mail after you visit with us. Thank you!        [employer] Information     [employer] lets you send messages to your doctor, view your test results, renew your prescriptions, schedule appointments and more. To sign up, go to [email or web address] . Click on \"Log in\" on the left side of the screen, which will take you to the Welcome page. Then click on \"Sign up Now\" on the right side of the page.     You will be asked to enter the access code listed below, as well as some personal information. Please follow the directions to create your username and password.     Your access code is: [email or web address]  Expires: 2018  6:24 AM     Your access code will  in 90 days. If you need help or a new code, please call your [facility] clinic or [phone].        Care EveryWhere ID     This is your Care EveryWhere ID. This could be used by other organizations to access " your [facility] medical records  [id number]        Equal Access to Services     JAMAAL SKELTON : Abilio Miranda, talat ponce, sony ontiveros. So [facility] [phone].    ATENCIÓN: Si habla español, tiene a vazquez disposición servicios gratuitos de asistencia lingüística. Llame al [phone].    We comply with applicable federal civil rights laws and [place] laws. We do not discriminate on the basis of race, color, national origin, age, disability, sex, sexual orientation, or gender identity.            After Visit Summary       This is your record. Keep this with you and show to your community pharmacist(s) and doctor(s) at your next visit.

## 2018-06-09 NOTE — ED AVS SNAPSHOT
[facility], Emergency Department    [address]    [facility] [address]    Phone:  [phone]    Fax:  [phone]                                       Kari Mackay   MRN: [id number]    Department:  [facility], Emergency Department   Date of Visit:  6/9/2018           After Visit Summary Signature Page     I have received my discharge instructions, and my questions have been answered. I have discussed any challenges I see with this plan with the nurse or doctor.    ..........................................................................................................................................  Patient/Patient Representative Signature      ..........................................................................................................................................  Patient Representative Print Name and Relationship to Patient    ..................................................               ................................................  Date                                            Time    ..........................................................................................................................................  Reviewed by Signature/Title    ...................................................              ..............................................  Date                                                            Time

## 2018-06-09 NOTE — ED PROVIDER NOTES
History     Chief Complaint   Patient presents with     Alcohol Intoxication     Pt drinking on street corner with friend. Became too intoxicated and friend called EMS.      The history is provided by the EMS personnel. The history is limited by the condition of the patient (AMS - alcohol intoxication).     Kari Mackay is a 52 year old female with a history of osteoarthritis, alcohol abuse, and alcohol withdrawal seizures who presents via EMS for evaluation of alcohol intoxication. Per EMS report, the patient was drinking with a friend on a street corner when her friend felt she was too intoxicated so called EMS on her behalf. No known trauma per EMS report. Further history is limited due to patient's alcohol intoxication.     I have reviewed the Medications, Allergies, Past Medical and Surgical History, and Social History in the [employer] system.  Past Medical History:   Diagnosis Date     Alcohol dependence with withdrawal (H)     multiple detox attempts     Osteoarthritis of right knee      Seizures (H)     alcohol withdrawal     Substance abuse        Past Surgical History:   Procedure Laterality Date     GALLBLADDER SURGERY       GYN SURGERY         No family history on file.    Social History   Substance Use Topics     Smoking status: Current Every Day Smoker     Packs/day: 0.25     Smokeless tobacco: Never Used     Alcohol use Yes      Comment: Daily 1-2 liters per day       No current facility-administered medications for this encounter.      Current Outpatient Prescriptions   Medication     loratadine (CLARITIN) 10 MG tablet     multivitamin, therapeutic with minerals (THERA-VIT-M) TABS        Allergies   Allergen Reactions     Acetaminophen      Penicillins Other (See Comments)     Doesn't know     Percocet [Oxycodone-Acetaminophen]        Review of Systems   Unable to perform ROS: Mental status change (alcohol intoxication)       Physical Exam   BP: 124/77  Pulse: 114  Temp: 96.9  F (36.1  C)  Resp:  20  SpO2: 94 %      Physical Exam   Constitutional: She appears well-developed and well-nourished.   Snoring; no signs of trauma; breathing regularly   HENT:   Head: Normocephalic and atraumatic.   Eyes: Pupils are equal, round, and reactive to light.   2 mm equal pupils    Neck: Normal range of motion.   Cardiovascular: Normal rate, regular rhythm and normal heart sounds.    Pulmonary/Chest: Effort normal and breath sounds normal. No respiratory distress.   Abdominal: Soft. She exhibits no distension. There is no tenderness. There is no rebound.   Musculoskeletal: She exhibits no edema, tenderness or deformity.   Neurological:   asleep   Skin: Skin is warm and dry.   Psychiatric:   Unable to evaluate       ED Course     ED Course     Procedures             Critical Care time:  none     Results for orders placed or performed during the hospital encounter of 06/09/18   Alcohol breath test POCT   Result Value Ref Range    Alcohol Breath Test  0.00 - 0.01   Alcohol breath test POCT   Result Value Ref Range    Alcohol Breath Test 0.221 (A) 0.00 - 0.01     Medications - No data to display            Labs Ordered and Resulted from Time of ED Arrival Up to the Time of Departure from the ED   ALCOHOL BREATH TEST POCT - Normal   DRUG ABUSE SCREEN 6 CHEM DEP URINE ([facility])            Assessments & Plan (with Medical Decision Making)   Patient was brought to the ER acutely intoxicated.  Patient had no signs of trauma.  Patient woke up around 11:30 PM.  I went and examined the patient.  She was sitting in bed watching TV.  She said that she was homeless and lived on the streets.  She denied any physical pain.  No acute mental health concerns.  Patient is currently intoxicated 0.221.  Will allow the patient to sleep in the ER overnight.  Patient will be discharged home in the morning.    I have reviewed the nursing notes.    I have reviewed the findings, diagnosis, plan and need for follow up with the patient.    New Prescriptions     No medications on file       Final diagnoses:   Alcoholic intoxication without complication (H)   IHoney, am serving as a trained medical scribe to document services personally performed by Supriya Leblanc MD, based on the provider's statements to me.   Supriya VELARDE MD, was physically present and have reviewed and verified the accuracy of this note documented by Honey Castaneda.      6/9/2018   [facility], [place], EMERGENCY DEPARTMENT     Supriya Leblanc MD  06/10/18 0018

## 2018-06-10 VITALS
TEMPERATURE: 97.2 F | RESPIRATION RATE: 20 BRPM | SYSTOLIC BLOOD PRESSURE: 148 MMHG | HEART RATE: 95 BPM | DIASTOLIC BLOOD PRESSURE: 88 MMHG | OXYGEN SATURATION: 98 %

## 2018-06-10 NOTE — ED NOTES
Awake and alert this morning after several hours of observation. She is able to safely ambulate and is tolerating oral fluids well. No new symptoms reported this morning. Will discharge to outpatient follow up as per previous plan.     Rodney Shay MD  06/10/18 0696

## 2018-06-30 ENCOUNTER — HOSPITAL ENCOUNTER (INPATIENT)
Facility: CLINIC | Age: 53
LOS: 1 days | Discharge: NURSING, REHAB OR LONG-TERM CARE | End: 2018-07-02
Attending: FAMILY MEDICINE | Admitting: INTERNAL MEDICINE
Payer: COMMERCIAL

## 2018-06-30 DIAGNOSIS — E87.0 HYPERNATREMIA: ICD-10-CM

## 2018-06-30 DIAGNOSIS — F10.229 ALCOHOL DEPENDENCE WITH INTOXICATION WITH COMPLICATION (H): ICD-10-CM

## 2018-06-30 DIAGNOSIS — F44.5 PSEUDOSEIZURES: ICD-10-CM

## 2018-06-30 LAB
ALBUMIN SERPL-MCNC: 3.1 G/DL (ref 3.4–5)
ALCOHOL BREATH TEST: 0.28 (ref 0–0.01)
ALP SERPL-CCNC: 127 U/L (ref 40–150)
ALT SERPL W P-5'-P-CCNC: 13 U/L (ref 0–50)
AMPHETAMINES UR QL SCN: NEGATIVE
ANION GAP SERPL CALCULATED.3IONS-SCNC: 11 MMOL/L (ref 3–14)
ANION GAP SERPL CALCULATED.3IONS-SCNC: 13 MMOL/L (ref 3–14)
AST SERPL W P-5'-P-CCNC: 38 U/L (ref 0–45)
BARBITURATES UR QL: NEGATIVE
BASOPHILS # BLD AUTO: 0.1 10E9/L (ref 0–0.2)
BASOPHILS NFR BLD AUTO: 0.9 %
BENZODIAZ UR QL: POSITIVE
BILIRUB SERPL-MCNC: 0.2 MG/DL (ref 0.2–1.3)
BUN SERPL-MCNC: 12 MG/DL (ref 7–30)
BUN SERPL-MCNC: 9 MG/DL (ref 7–30)
CALCIUM SERPL-MCNC: 7.2 MG/DL (ref 8.5–10.1)
CALCIUM SERPL-MCNC: 7.6 MG/DL (ref 8.5–10.1)
CANNABINOIDS UR QL SCN: NEGATIVE
CHLORIDE SERPL-SCNC: 115 MMOL/L (ref 94–109)
CHLORIDE SERPL-SCNC: 115 MMOL/L (ref 94–109)
CO2 SERPL-SCNC: 24 MMOL/L (ref 20–32)
CO2 SERPL-SCNC: 25 MMOL/L (ref 20–32)
COCAINE UR QL: POSITIVE
CREAT SERPL-MCNC: 0.52 MG/DL (ref 0.52–1.04)
CREAT SERPL-MCNC: 0.68 MG/DL (ref 0.52–1.04)
DIFFERENTIAL METHOD BLD: ABNORMAL
EOSINOPHIL # BLD AUTO: 0.1 10E9/L (ref 0–0.7)
EOSINOPHIL NFR BLD AUTO: 1.1 %
ERYTHROCYTE [DISTWIDTH] IN BLOOD BY AUTOMATED COUNT: 16.4 % (ref 10–15)
ETHANOL SERPL-MCNC: 0.34 G/DL
ETHANOL UR QL SCN: POSITIVE
GFR SERPL CREATININE-BSD FRML MDRD: >90 ML/MIN/1.7M2
GFR SERPL CREATININE-BSD FRML MDRD: >90 ML/MIN/1.7M2
GLUCOSE SERPL-MCNC: 61 MG/DL (ref 70–99)
GLUCOSE SERPL-MCNC: 97 MG/DL (ref 70–99)
HCT VFR BLD AUTO: 29.8 % (ref 35–47)
HGB BLD-MCNC: 9.5 G/DL (ref 11.7–15.7)
IMM GRANULOCYTES # BLD: 0 10E9/L (ref 0–0.4)
IMM GRANULOCYTES NFR BLD: 0.4 %
LIPASE SERPL-CCNC: 193 U/L (ref 73–393)
LYMPHOCYTES # BLD AUTO: 2.2 10E9/L (ref 0.8–5.3)
LYMPHOCYTES NFR BLD AUTO: 29.5 %
MCH RBC QN AUTO: 28.7 PG (ref 26.5–33)
MCHC RBC AUTO-ENTMCNC: 31.9 G/DL (ref 31.5–36.5)
MCV RBC AUTO: 90 FL (ref 78–100)
MONOCYTES # BLD AUTO: 0.4 10E9/L (ref 0–1.3)
MONOCYTES NFR BLD AUTO: 5.8 %
NEUTROPHILS # BLD AUTO: 4.7 10E9/L (ref 1.6–8.3)
NEUTROPHILS NFR BLD AUTO: 62.3 %
NRBC # BLD AUTO: 0 10*3/UL
NRBC BLD AUTO-RTO: 0 /100
OPIATES UR QL SCN: NEGATIVE
PLATELET # BLD AUTO: 241 10E9/L (ref 150–450)
POTASSIUM SERPL-SCNC: 3.6 MMOL/L (ref 3.4–5.3)
POTASSIUM SERPL-SCNC: 3.6 MMOL/L (ref 3.4–5.3)
PROT SERPL-MCNC: 7 G/DL (ref 6.8–8.8)
RBC # BLD AUTO: 3.31 10E12/L (ref 3.8–5.2)
SODIUM SERPL-SCNC: 151 MMOL/L (ref 133–144)
SODIUM SERPL-SCNC: 152 MMOL/L (ref 133–144)
WBC # BLD AUTO: 7.5 10E9/L (ref 4–11)

## 2018-06-30 PROCEDURE — 80053 COMPREHEN METABOLIC PANEL: CPT | Performed by: FAMILY MEDICINE

## 2018-06-30 PROCEDURE — 99285 EMERGENCY DEPT VISIT HI MDM: CPT | Mod: Z6 | Performed by: FAMILY MEDICINE

## 2018-06-30 PROCEDURE — 83690 ASSAY OF LIPASE: CPT | Performed by: FAMILY MEDICINE

## 2018-06-30 PROCEDURE — 80320 DRUG SCREEN QUANTALCOHOLS: CPT | Performed by: FAMILY MEDICINE

## 2018-06-30 PROCEDURE — 96366 THER/PROPH/DIAG IV INF ADDON: CPT | Performed by: FAMILY MEDICINE

## 2018-06-30 PROCEDURE — 80048 BASIC METABOLIC PNL TOTAL CA: CPT | Performed by: FAMILY MEDICINE

## 2018-06-30 PROCEDURE — 80307 DRUG TEST PRSMV CHEM ANLYZR: CPT | Performed by: FAMILY MEDICINE

## 2018-06-30 PROCEDURE — 25000128 H RX IP 250 OP 636: Performed by: FAMILY MEDICINE

## 2018-06-30 PROCEDURE — 85025 COMPLETE CBC W/AUTO DIFF WBC: CPT | Performed by: FAMILY MEDICINE

## 2018-06-30 PROCEDURE — 96361 HYDRATE IV INFUSION ADD-ON: CPT | Performed by: FAMILY MEDICINE

## 2018-06-30 PROCEDURE — 82075 ASSAY OF BREATH ETHANOL: CPT | Performed by: FAMILY MEDICINE

## 2018-06-30 PROCEDURE — 25000125 ZZHC RX 250: Performed by: FAMILY MEDICINE

## 2018-06-30 PROCEDURE — 96365 THER/PROPH/DIAG IV INF INIT: CPT | Performed by: FAMILY MEDICINE

## 2018-06-30 PROCEDURE — 99285 EMERGENCY DEPT VISIT HI MDM: CPT | Mod: 25 | Performed by: FAMILY MEDICINE

## 2018-06-30 PROCEDURE — 25800025 ZZH RX 258: Performed by: FAMILY MEDICINE

## 2018-06-30 RX ADMIN — FOLIC ACID: 5 INJECTION, SOLUTION INTRAMUSCULAR; INTRAVENOUS; SUBCUTANEOUS at 19:47

## 2018-06-30 RX ADMIN — SODIUM CHLORIDE, POTASSIUM CHLORIDE, SODIUM LACTATE AND CALCIUM CHLORIDE 1000 ML: 600; 310; 30; 20 INJECTION, SOLUTION INTRAVENOUS at 21:43

## 2018-06-30 NOTE — ED NOTES
Bed: ED12  Expected date: 6/30/18  Expected time: 6:45 PM  Means of arrival: Ambulance  Comments:  [facility]  53 Female  EToh

## 2018-06-30 NOTE — IP AVS SNAPSHOT
[facility] [address]    [address]    [facility] [place] [address]    Phone:  [phone]                                       After Visit Summary   6/30/2018    Kari Mackay    MRN: [id number]           After Visit Summary Signature Page     I have received my discharge instructions, and my questions have been answered. I have discussed any challenges I see with this plan with the nurse or doctor.    ..........................................................................................................................................  Patient/Patient Representative Signature      ..........................................................................................................................................  Patient Representative Print Name and Relationship to Patient    ..................................................               ................................................  Date                                            Time    ..........................................................................................................................................  Reviewed by Signature/Title    ...................................................              ..............................................  Date                                                            Time

## 2018-06-30 NOTE — IP AVS SNAPSHOT
MRN:[id number]                      After Visit Summary   6/30/2018    Kari Mackay    MRN: [id number]           Thank you!     Thank you for choosing [facility] for your care. Our goal is always to provide you with excellent care. Hearing back from our patients is one way we can continue to improve our services. Please take a few minutes to complete the written survey that you may receive in the mail after you visit with us. Thank you!        Patient Information     Date Of Birth          1965        Designated Caregiver       Most Recent Value    Caregiver    Will someone help with your care after discharge? no      About your hospital stay     You were admitted on:  July 1, 2018 You last received care in the:  [facility] [address]    You were discharged on:  July 2, 2018        Reason for your hospital stay       You were admitted with dehydration and alcohol intoxication                  Who to Call     For medical emergencies, please call 911.  For non-urgent questions about your medical care, please call your primary care provider or clinic, [phone]          Attending Provider     Provider Specialty    Louis Workman MD Emergency Medicine    ElbeAnam MD Internal Medicine       Primary Care Provider Office Phone # Fax #    Jackie Kendrick [phone]      Follow-up Appointments     Follow Up and recommended labs and tests       Follow up with your providers at the [facility]                  Pending Results     Date and Time Order Name Status Description    7/2/2018 0000 Folate In process     7/2/2018 0000 Vitamin B12 In process     7/1/2018 1940 EKG 12-lead, tracing only Preliminary             Statement of Approval     Ordered          07/02/18 0955  I have reviewed and agree with all the recommendations and orders detailed in this document.  EFFECTIVE NOW     Approved and electronically signed by:  Santiago Torres MD            "  Admission Information     Date & Time Provider Department Dept. Phone    2018 Anam Chairez MD [facility] [phone]      Your Vitals Were     Blood Pressure Pulse Temperature Respirations Pulse Oximetry       146/77 (BP Location: Right arm) 80 98.3  F (36.8  C) (Oral) 16 97%       MyChart Information     MyChart lets you send messages to your doctor, view your test results, renew your prescriptions, schedule appointments and more. To sign up, go to [email or web address] . Click on \"Log in\" on the left side of the screen, which will take you to the Welcome page. Then click on \"Sign up Now\" on the right side of the page.     You will be asked to enter the access code listed below, as well as some personal information. Please follow the directions to create your username and password.     Your access code is: [email or web address]  Expires: 2018  6:24 AM     Your access code will  in 90 days. If you need help or a new code, please call your [facility] clinic or [phone].        Care EveryWhere ID     This is your Care EveryWhere ID. This could be used by other organizations to access your [facility] medical records  [id number]        Equal Access to Services     JAMAAL SKELTON : Abilio bobo Sojana, waaxda luqadaha, qaybta kaalmada adeegyada, sony valencia. So [facility] [place] [facility] [phone].    ATENCIÓN: Si habla español, tiene a vazquez disposición servicios gratuitos de asistencia lingüística. Odalis al [phone].    We comply with applicable federal civil rights laws and [place] laws. We do not discriminate on the basis of race, color, national origin, age, disability, sex, sexual orientation, or gender identity.               Review of your medicines      CONTINUE these medicines which have NOT CHANGED        Dose / Directions    calcium-vitamin D 600-400 MG-UNIT per tablet   Commonly known as:  CALTRATE        Dose:  1 tablet   Take 1 tablet by mouth 2 times daily "   Refills:  0       cetirizine 10 MG tablet   Commonly known as:  zyrTEC        Dose:  10 mg   Take 10 mg by mouth daily   Refills:  0       CYANOCOBALAMIN PO        Dose:  1000 mcg   Take 1,000 mcg by mouth   Refills:  0       fluticasone 27.5 MCG/SPRAY spray   Commonly known as:  VERAMYST        Dose:  1 spray   Spray 1 spray into both nostrils daily   Refills:  0       multivitamin, therapeutic with minerals Tabs tablet   Used for:  Alcohol dependence (H)        Dose:  1 tablet   Take 1 tablet by mouth daily   Quantity:  30 each   Refills:  0       NALTREXONE HCL PO        Dose:  50 mg   Take 50 mg by mouth   Refills:  0       venlafaxine 75 MG 24 hr capsule   Commonly known as:  EFFEXOR-XR        Dose:  75 mg   Take 75 mg by mouth daily   Refills:  0                Protect others around you: Learn how to safely use, store and throw away your medicines at www.disposemymeds.org.             Medication List: This is a list of all your medications and when to take them. Check marks below indicate your daily home schedule. Keep this list as a reference.      Medications           Morning Afternoon Evening Bedtime As Needed    calcium-vitamin D 600-400 MG-UNIT per tablet   Commonly known as:  CALTRATE   Take 1 tablet by mouth 2 times daily                                cetirizine 10 MG tablet   Commonly known as:  zyrTEC   Take 10 mg by mouth daily                                CYANOCOBALAMIN PO   Take 1,000 mcg by mouth                                fluticasone 27.5 MCG/SPRAY spray   Commonly known as:  VERAMYST   Spray 1 spray into both nostrils daily                                multivitamin, therapeutic with minerals Tabs tablet   Take 1 tablet by mouth daily   Last time this was given:  1 tablet on 7/2/2018  9:52 AM                                NALTREXONE HCL PO   Take 50 mg by mouth                                venlafaxine 75 MG 24 hr capsule   Commonly known as:  EFFEXOR-XR   Take 75 mg by mouth daily

## 2018-07-01 PROBLEM — E87.0 HYPERNATREMIA: Status: ACTIVE | Noted: 2018-07-01

## 2018-07-01 LAB
ANION GAP SERPL CALCULATED.3IONS-SCNC: 10 MMOL/L (ref 3–14)
ANION GAP SERPL CALCULATED.3IONS-SCNC: 10 MMOL/L (ref 3–14)
BUN SERPL-MCNC: 8 MG/DL (ref 7–30)
BUN SERPL-MCNC: 8 MG/DL (ref 7–30)
CALCIUM SERPL-MCNC: 7.3 MG/DL (ref 8.5–10.1)
CALCIUM SERPL-MCNC: 7.9 MG/DL (ref 8.5–10.1)
CHLORIDE SERPL-SCNC: 103 MMOL/L (ref 94–109)
CHLORIDE SERPL-SCNC: 112 MMOL/L (ref 94–109)
CO2 SERPL-SCNC: 27 MMOL/L (ref 20–32)
CO2 SERPL-SCNC: 30 MMOL/L (ref 20–32)
CREAT SERPL-MCNC: 0.44 MG/DL (ref 0.52–1.04)
CREAT SERPL-MCNC: 0.45 MG/DL (ref 0.52–1.04)
GFR SERPL CREATININE-BSD FRML MDRD: >90 ML/MIN/1.7M2
GFR SERPL CREATININE-BSD FRML MDRD: >90 ML/MIN/1.7M2
GLUCOSE BLDC GLUCOMTR-MCNC: 148 MG/DL (ref 70–99)
GLUCOSE SERPL-MCNC: 135 MG/DL (ref 70–99)
GLUCOSE SERPL-MCNC: 98 MG/DL (ref 70–99)
MAGNESIUM SERPL-MCNC: 1.2 MG/DL (ref 1.6–2.3)
POTASSIUM SERPL-SCNC: 3.4 MMOL/L (ref 3.4–5.3)
POTASSIUM SERPL-SCNC: 3.6 MMOL/L (ref 3.4–5.3)
SODIUM SERPL-SCNC: 143 MMOL/L (ref 133–144)
SODIUM SERPL-SCNC: 149 MMOL/L (ref 133–144)
TSH SERPL DL<=0.005 MIU/L-ACNC: 2.1 MU/L (ref 0.4–4)

## 2018-07-01 PROCEDURE — 25000128 H RX IP 250 OP 636: Performed by: INTERNAL MEDICINE

## 2018-07-01 PROCEDURE — 25000132 ZZH RX MED GY IP 250 OP 250 PS 637: Performed by: INTERNAL MEDICINE

## 2018-07-01 PROCEDURE — 99222 1ST HOSP IP/OBS MODERATE 55: CPT | Mod: AI | Performed by: INTERNAL MEDICINE

## 2018-07-01 PROCEDURE — 12000001 ZZH R&B MED SURG/OB UMMC

## 2018-07-01 PROCEDURE — 83735 ASSAY OF MAGNESIUM: CPT | Performed by: INTERNAL MEDICINE

## 2018-07-01 PROCEDURE — 84443 ASSAY THYROID STIM HORMONE: CPT | Performed by: INTERNAL MEDICINE

## 2018-07-01 PROCEDURE — 80048 BASIC METABOLIC PNL TOTAL CA: CPT | Performed by: INTERNAL MEDICINE

## 2018-07-01 PROCEDURE — 96361 HYDRATE IV INFUSION ADD-ON: CPT | Performed by: FAMILY MEDICINE

## 2018-07-01 PROCEDURE — 36415 COLL VENOUS BLD VENIPUNCTURE: CPT | Performed by: INTERNAL MEDICINE

## 2018-07-01 PROCEDURE — 93005 ELECTROCARDIOGRAM TRACING: CPT

## 2018-07-01 PROCEDURE — 00000146 ZZHCL STATISTIC GLUCOSE BY METER IP

## 2018-07-01 PROCEDURE — HZ2ZZZZ DETOXIFICATION SERVICES FOR SUBSTANCE ABUSE TREATMENT: ICD-10-PCS | Performed by: INTERNAL MEDICINE

## 2018-07-01 PROCEDURE — 93010 ELECTROCARDIOGRAM REPORT: CPT | Performed by: INTERNAL MEDICINE

## 2018-07-01 PROCEDURE — 84295 ASSAY OF SERUM SODIUM: CPT | Performed by: INTERNAL MEDICINE

## 2018-07-01 PROCEDURE — 25800025 ZZH RX 258: Performed by: INTERNAL MEDICINE

## 2018-07-01 RX ORDER — MULTIPLE VITAMINS W/ MINERALS TAB 9MG-400MCG
1 TAB ORAL DAILY
Status: DISCONTINUED | OUTPATIENT
Start: 2018-07-01 | End: 2018-07-02 | Stop reason: HOSPADM

## 2018-07-01 RX ORDER — LANOLIN ALCOHOL/MO/W.PET/CERES
100 CREAM (GRAM) TOPICAL DAILY
Status: DISCONTINUED | OUTPATIENT
Start: 2018-07-02 | End: 2018-07-01

## 2018-07-01 RX ORDER — DEXTROSE MONOHYDRATE 50 MG/ML
INJECTION, SOLUTION INTRAVENOUS CONTINUOUS
Status: DISCONTINUED | OUTPATIENT
Start: 2018-07-01 | End: 2018-07-01

## 2018-07-01 RX ORDER — LORATADINE 10 MG/1
10 TABLET ORAL DAILY
Status: DISCONTINUED | OUTPATIENT
Start: 2018-07-01 | End: 2018-07-02 | Stop reason: HOSPADM

## 2018-07-01 RX ORDER — DIAZEPAM 5 MG
5-20 TABLET ORAL EVERY 30 MIN PRN
Status: DISCONTINUED | OUTPATIENT
Start: 2018-07-01 | End: 2018-07-02 | Stop reason: HOSPADM

## 2018-07-01 RX ORDER — LANOLIN ALCOHOL/MO/W.PET/CERES
100 CREAM (GRAM) TOPICAL DAILY
Status: DISCONTINUED | OUTPATIENT
Start: 2018-07-01 | End: 2018-07-02 | Stop reason: HOSPADM

## 2018-07-01 RX ORDER — MULTIPLE VITAMINS W/ MINERALS TAB 9MG-400MCG
1 TAB ORAL DAILY
Status: DISCONTINUED | OUTPATIENT
Start: 2018-07-01 | End: 2018-07-01

## 2018-07-01 RX ORDER — NALOXONE HYDROCHLORIDE 0.4 MG/ML
.1-.4 INJECTION, SOLUTION INTRAMUSCULAR; INTRAVENOUS; SUBCUTANEOUS
Status: DISCONTINUED | OUTPATIENT
Start: 2018-07-01 | End: 2018-07-02 | Stop reason: HOSPADM

## 2018-07-01 RX ORDER — MAGNESIUM SULFATE HEPTAHYDRATE 40 MG/ML
4 INJECTION, SOLUTION INTRAVENOUS EVERY 4 HOURS PRN
Status: DISCONTINUED | OUTPATIENT
Start: 2018-07-01 | End: 2018-07-01

## 2018-07-01 RX ORDER — ONDANSETRON 2 MG/ML
4 INJECTION INTRAMUSCULAR; INTRAVENOUS EVERY 6 HOURS PRN
Status: DISCONTINUED | OUTPATIENT
Start: 2018-07-01 | End: 2018-07-02 | Stop reason: HOSPADM

## 2018-07-01 RX ORDER — ONDANSETRON 2 MG/ML
4 INJECTION INTRAMUSCULAR; INTRAVENOUS EVERY 6 HOURS PRN
Status: DISCONTINUED | OUTPATIENT
Start: 2018-07-01 | End: 2018-07-01

## 2018-07-01 RX ORDER — DEXTROSE MONOHYDRATE, SODIUM CHLORIDE, AND POTASSIUM CHLORIDE 50; 1.49; 4.5 G/1000ML; G/1000ML; G/1000ML
INJECTION, SOLUTION INTRAVENOUS CONTINUOUS
Status: DISCONTINUED | OUTPATIENT
Start: 2018-07-01 | End: 2018-07-02

## 2018-07-01 RX ORDER — FOLIC ACID 1 MG/1
1 TABLET ORAL DAILY
Status: DISCONTINUED | OUTPATIENT
Start: 2018-07-01 | End: 2018-07-02 | Stop reason: HOSPADM

## 2018-07-01 RX ADMIN — DEXTROSE MONOHYDRATE: 50 INJECTION, SOLUTION INTRAVENOUS at 04:45

## 2018-07-01 RX ADMIN — DIAZEPAM 5 MG: 5 TABLET ORAL at 14:24

## 2018-07-01 RX ADMIN — DEXTROSE MONOHYDRATE: 50 INJECTION, SOLUTION INTRAVENOUS at 17:57

## 2018-07-01 RX ADMIN — MULTIPLE VITAMINS W/ MINERALS TAB 1 TABLET: TAB at 10:12

## 2018-07-01 RX ADMIN — POTASSIUM CHLORIDE, DEXTROSE MONOHYDRATE AND SODIUM CHLORIDE: 150; 5; 450 INJECTION, SOLUTION INTRAVENOUS at 20:15

## 2018-07-01 RX ADMIN — Medication 100 MG: at 10:12

## 2018-07-01 RX ADMIN — FOLIC ACID 1 MG: 1 TABLET ORAL at 10:12

## 2018-07-01 RX ADMIN — MAGNESIUM SULFATE HEPTAHYDRATE 4 G: 500 INJECTION, SOLUTION INTRAMUSCULAR; INTRAVENOUS at 21:17

## 2018-07-01 RX ADMIN — LORATADINE 10 MG: 10 TABLET ORAL at 10:12

## 2018-07-01 RX ADMIN — ONDANSETRON 4 MG: 2 INJECTION INTRAMUSCULAR; INTRAVENOUS at 21:18

## 2018-07-01 RX ADMIN — DIAZEPAM 5 MG: 5 TABLET ORAL at 17:57

## 2018-07-01 ASSESSMENT — ENCOUNTER SYMPTOMS
NUMBNESS: 0
WEAKNESS: 0
FEVER: 0
ABDOMINAL PAIN: 0
SPEECH DIFFICULTY: 0
SHORTNESS OF BREATH: 0

## 2018-07-01 ASSESSMENT — ACTIVITIES OF DAILY LIVING (ADL)
RETIRED_EATING: 0-->INDEPENDENT
COGNITION: 2 - DIFFICULTY WITH ORGANIZING THOUGHTS
DRESS: 0-->INDEPENDENT
ADLS_ACUITY_SCORE: 8.5
RETIRED_COMMUNICATION: 0-->UNDERSTANDS/COMMUNICATES WITHOUT DIFFICULTY
ADLS_ACUITY_SCORE: 8.5
BATHING: 0-->INDEPENDENT
SWALLOWING: 0-->SWALLOWS FOODS/LIQUIDS WITHOUT DIFFICULTY
TRANSFERRING: 0-->INDEPENDENT
TOILETING: 0-->INDEPENDENT
AMBULATION: 0-->INDEPENDENT
ADLS_ACUITY_SCORE: 8.5
FALL_HISTORY_WITHIN_LAST_SIX_MONTHS: YES

## 2018-07-01 NOTE — PROGRESS NOTES
Pt arrived to unit from ED at approximately 0750. 1:1 sitter in place for safety per ED provider recommendation.

## 2018-07-01 NOTE — H&P
Medicine History and Physical  Department of Internal Medicine    Patient Name: Kari Mackay MRN# [id number]   Age: 53 year old YOB: 1965     Date of Admission:6/30/2018             Assessment and Plan:   Kari Mackay is a 53 year old female with past medical history significant for polysubstance abuse alcohol use disorder, pseudoseizures presented to the ED intoxicated admitted for hypernatremia and possible detox    #Hyponatremia: Patient is clinically hypovolemic.  Initial sodium in the -> 151 after 1l LR in the ED. suspect insensible  loss form heat.   calculated free water deficit is about 3.5 L.  Last sodium from our system is from 6/3 which was 147  -Start D5W at a rate of 75/h.  Plan is to lower her sodium slowly to goal of no more than 10-12 mEq per 24  -Check sodium every 4 hours  -If no improvement consider workup for diabetes insipidus and another central causes    #.  Alcohol dependence:   #.  History of pseudoseizures  #.  Polysubstance abuse: Urine tox positive for cocaine   With multiple ED visits intoxicated.  Patient is intoxicated  -Once electrolyte improving will transition patient to detox  -Monitor for withdrawal seizure and DTs  -Start MSSA protocol once patient is on the floar   -S/p babana bag in the ED. Will start high dose thiamin and folic acid daily       CODE: FULL  Diet/IVF: Advance diet as tolerated  DVT ppx: Ambulate  Disposition/Admission Status: Anticipate 1-2 days, may be here more than 2 midnights    Nigel Leahy MD  Hospitalist/noctanaist  [facility]    Departments of Medicine   Pager: [phone]               Chief Complaint:     Brought by paramedic       HPI:   53-year-old female past medical history of polysubstance abuse alcohol use disorder presented to the ED by paramedics intoxicated.  I was unable to acquire full history due to patient intoxication.   Per EMS report patient was discovered by  [facility] fire [employer] laying down the road.  She reports that she has been drinking but does not know how much.  Per ED provider patient was having some involuntary shaking while having conversation and alert.  During my encounter did not notice any pseudoseizures.          Past Medical History:     Past Medical History:   Diagnosis Date     Alcohol dependence with withdrawal (H)     multiple detox attempts     Osteoarthritis of right knee      Seizures (H)     alcohol withdrawal     Substance abuse             Past Surgical History:     Past Surgical History:   Procedure Laterality Date     GALLBLADDER SURGERY       GYN SURGERY                Social History:     Social History     Social History     Marital status: Single     Spouse name: N/A     Number of children: N/A     Years of education: N/A     Occupational History     Not on file.     Social History Main Topics     Smoking status: Current Every Day Smoker     Packs/day: 0.25     Smokeless tobacco: Never Used     Alcohol use Yes      Comment: Daily 1-2 liters per day     Drug use: Yes     Special: Marijuana      Comment: occ     Sexual activity: Not on file     Other Topics Concern     Not on file     Social History Narrative            Family History:   Unable to acquire due to patient's intoxication         Immunizations:     There is no immunization history on file for this patient.           Allergies:      Allergies   Allergen Reactions     Acetaminophen      Penicillins Other (See Comments)     Doesn't know     Percocet [Oxycodone-Acetaminophen]             Medications:       No current facility-administered medications on file prior to encounter.   Current Outpatient Prescriptions on File Prior to Encounter:  loratadine (CLARITIN) 10 MG tablet Take 10 mg by mouth daily   multivitamin, therapeutic with minerals (THERA-VIT-M) TABS Take 1 tablet by mouth daily            Review of Systems:     Unable to acquire due to patient intoxication        Physical Exam:   Blood pressure 113/68, temperature 97  F (36.1  C), temperature source Oral, resp. rate 18, SpO2 96 %.  General: Alert, looks disheveled  HEENT: AT/NC, conjunctivae anicteric  Neck: Supple,  Resp: Mild tachypnea. Audible bibasilar crackles, no appreciable wheezes.    Cardiac: Regular rate and rhythm,   Abdomen: Soft, nontender,  +BS.  Extremities: 2-3+ LE edema,   Skin: Warm and dry, no jaundice or visible rash  Neuro:  AAOx3, moves all extremities equally               Data:   LABORATORY DATA    CMP  Recent Labs  Lab 06/30/18  2312 06/30/18 1939   * 152*   POTASSIUM 3.6 3.6   CHLORIDE 115* 115*   CO2 25 24   ANIONGAP 11 13   GLC 61* 97   BUN 9 12   CR 0.52 0.68   GFRESTIMATED >90 >90   GFRESTBLACK >90 >90   RYAN 7.2* 7.6*   PROTTOTAL  --  7.0   ALBUMIN  --  3.1*   BILITOTAL  --  0.2   ALKPHOS  --  127   AST  --  38   ALT  --  13     CBC  Recent Labs  Lab 06/30/18 1939   WBC 7.5   RBC 3.31*   HGB 9.5*   HCT 29.8*   MCV 90   MCH 28.7   MCHC 31.9   RDW 16.4*        INRNo lab results found in last 7 days.  Lactic AcidInvalid input(s): LACTIC ACID  Other labs:    No intake or output data in the 24 hours ending 07/01/18 0602     Arterial Blood GasNo lab results found in last 7 days.  Venous Blood Gas No lab results found in last 7 days.    Inflammatory Markers  No lab results found.    Immune Globulin Studies  No lab results found.    Microbiology:  No results found for: CULT  No lab results found.    Urine Studies    Recent Labs   Lab Test  03/06/16   0030  08/06/15   1829  02/25/15   1310  07/24/12   1505   LEUKEST  Small*  Negative  Negative  Small*   WBCU  3*  0  1  5*

## 2018-07-01 NOTE — ED NOTES
[facility]   ED Nurse to Floor Handoff     Kari Mcakay is a 53 year old female who speaks English and lives unknown,  is homeless  They arrived in the ED by ambulance from home    ED Chief Complaint: Alcohol Intoxication (Pt found intoxicated in the middle of the road by [employer]    ED Dx;   Final diagnoses:   Alcohol dependence with intoxication with complication (H)   Pseudoseizures   Hypernatremia         Needed?: No    Allergies:   Allergies   Allergen Reactions     Acetaminophen      Penicillins Other (See Comments)     Doesn't know     Percocet [Oxycodone-Acetaminophen]    .  Past Medical Hx:   Past Medical History:   Diagnosis Date     Alcohol dependence with withdrawal (H)     multiple detox attempts     Osteoarthritis of right knee      Seizures (H)     alcohol withdrawal     Substance abuse       Baseline Mental status: other Under the influence of alcohol  Current Mental Status changes: at basesline    Infection present or suspected this encounter: no  Sepsis suspected: No  Isolation type: No active isolations     Activity level - Baseline/Home:  Stand with Assist, pt uses cane to ambulate. History of osteoarthritis.   Activity Level - Current:   Stand with Assist    Bariatric equipment needed?: No    In the ED these meds were given:   Medications   dextrose 5% and 0.45% NaCl 1,000 mL with INFUVITE 10 mL, thiamine 100 mg, folic acid 1 mg infusion ( Intravenous Stopped 6/30/18 2146)   lactated ringers BOLUS 1,000 mL (0 mLs Intravenous Stopped 6/30/18 2330)       Drips running?  No    Home pump  No    Current LDAs  Peripheral IV 06/30/18 Left Hand (Active)   Site Assessment WDL 6/30/2018  7:40 PM   Line Status Saline locked 6/30/2018  7:40 PM   Number of days:1       Labs results:   Labs Ordered and Resulted from Time of ED Arrival Up to the Time of Departure from the ED   DRUG ABUSE SCREEN 6 CHEM DEP URINE ([facility]) - Abnormal; Notable for  the following:        Result Value    Benzodiazepine Qual Urine Positive (*)     Cocaine Qual Urine Positive (*)     Ethanol Qual Urine Positive (*)     All other components within normal limits   CBC WITH PLATELETS DIFFERENTIAL - Abnormal; Notable for the following:     RBC Count 3.31 (*)     Hemoglobin 9.5 (*)     Hematocrit 29.8 (*)     RDW 16.4 (*)     All other components within normal limits   COMPREHENSIVE METABOLIC PANEL - Abnormal; Notable for the following:     Sodium 152 (*)     Chloride 115 (*)     Calcium 7.6 (*)     Albumin 3.1 (*)     All other components within normal limits   ALCOHOL ETHYL - Abnormal; Notable for the following:     Ethanol g/dL 0.34 (*)     All other components within normal limits   BASIC METABOLIC PANEL - Abnormal; Notable for the following:     Sodium 151 (*)     Chloride 115 (*)     Glucose 61 (*)     Calcium 7.2 (*)     All other components within normal limits   GLUCOSE BY METER - Abnormal; Notable for the following:     Glucose 148 (*)     All other components within normal limits   ALCOHOL BREATH TEST POCT - Abnormal; Notable for the following:     Alcohol Breath Test 0.284 (*)     All other components within normal limits   LIPASE       Imaging Studies: No results found for this or any previous visit (from the past 24 hour(s)).    Recent vital signs:   /68  Temp 97  F (36.1  C) (Oral)  Resp 18  SpO2 96%    Cardiac Rhythm: Normal Sinus  Pt needs tele? Yes  Skin/wound Issues: None    Code Status: Full Code    Pain control: pt had none    Nausea control: pt had none    Abnormal labs/tests/findings requiring intervention: Sodium 152. Blood glucose 61, provided OJ.     Family present during ED course? No   Family Comments/Social Situation comments: Homeless.     Tasks needing completion: Anticipate needs. Monitor blood glucose. Seizure precaution in place.     Vazquez Montes, FELIZ  ascom--   [phone] [facility]  [phone] [facility] ED

## 2018-07-01 NOTE — H&P
Admitted:     06/30/2018      CHIEF COMPLAINT:  Elevated sodium in the setting of alcohol excess.      HISTORY OF PRESENT ILLNESS:  The patient is a 53-year-old female with longstanding history of alcohol abuse/dependence, polysubstance abuse, pseudoseizures and generalized osteoarthritis admitted to the medical service through the emergency department where she presented by medics with alcohol intoxication in association with hypernatremia.  Admitted to the medical service for detox and intravenous fluid support.      The patient relates history of alcohol dependency on and off since age 21, multiple CD and detox interventions.  Following her last CD treatment, patient indicates that she went out to celebrate with drinking.  Unsure in terms of longest sobriety.  Off alcohol for 3-4 months.  Living on the [address].  Relapsed on 06/28 with up to 2 bottles of vodka daily.  Last drink the afternoon of 06/30.  Indicates multiple falls without closed head injury.  Abrasion to the left knee.  Apparently was found by EMS lying down on the road, taken by the [facility] to the emergency department at [facility].        In the emergency department, blood pressure 113/68, tachycardic with heart rate in the low 100s.  Temperature normal.  O2 saturation 96% on room air. HEENT demonstrated the head to be atraumatic.  The patient was noted to have shaking in the emergency department while she was conversant and alert   attributed to a known history of pseudoseizures.  Laboratory evaluation included complete metabolic profile remarkable for serum sodium 152, potassium 3.6, chloride 115, CO2 of 24, BUN of 12, creatinine of 0.68.  Normal liver profile.  Lipase 193.  White count 7500, hemoglobin 9.5 g%, a platelet count of 241,000.  Alcohol level 0.34, up from breath test of 0.284 earlier.  U-tox positive for cocaine, benzodiazepine and alcohol.  The patient received a banana bag and a liter of lactated  Ringer's.  Persistent hypernatremia with followup sodium of 151 and early this morning of 149.  Since, placed IV D5W infusion at 75 mL per hour.      Clinically, at present patient relatively alert and oriented, able to provide historical details.  Alcohol intake as above.  Recurrent falls without a closed head injury.  Prior history of withdrawal with shakes and sweats.  No overt symptoms presently.  Issue with pseudoseizures as well as possible true withdrawal- related seizures remotely.  No known history of alcohol-related liver disease.  Does indicate prior history of pancreatitis.  No knowledge regarding DTs, peptic ulcer disease, or upper GI bleed.  Does indicate occasional marijuana use.  Did smoke cocaine on the day of presentation.  Indicates issue with depression, which she feels remains compensated.  Indicates that she has outpatient followup.  Off her meds since 06/29.  Prior admits to [facility] Depend[facility] by Dr. Erwin and Destiny with last hospitalization in 02/2015.  The patient remains homeless.      PAST MEDICAL HISTORY:   1.  Alcohol abuse/dependence (as above).   2.  Polysubstance abuse.   3.  Generalized osteoarthritis with particular involvement of the right knee.   4.  Pseudoseizures with possible 2 alcohol withdrawal related seizures (remotely).   5.  History of blood pressure elevation without diagnosis or treatment for hypertension.   6.  History of anemia (details not available).      PAST SURGICAL HISTORY:   1.  Cholecystectomy.   2.  Gynecologic surgery.   3.  Surgery for right hand fracture.      Without known heart disease, diabetes, asthma, intrinsic renal disease, peptic ulcer disease, hepatitis, thyroid disease or tuberculosis.      ALLERGIES:  PENICILLIN (RASH).      ADMISSION MEDICATIONS:  Claritin 10 mg daily and multivitamin daily.  Uncertain as to what antidepressant the patient may have been on.        FAMILY HISTORY:  The patient indicates no contact.  Denies known heart  disease or diabetes.      HABITS:  Cigarette use (unclear quantity).  Declines patch.  Marijuana and cocaine as above.  Alcohol as above.      SOCIAL HISTORY:  Single.  Five children.  Homeless.      REVIEW OF SYSTEMS:  Denies fever or chills.  No headache or dizziness, no visual change.  Without chest discomfort, dyspnea, cough.  No nausea or vomiting.  No reflux or abdominal pain, no diarrhea or constipation.  Formed bowel movement earlier today.  No signs of GI blood loss.  No voiding complaints.  Generalized arthralgias.  No rash, no focal neurologic complaint.      OBJECTIVE:   GENERAL:  Mildly overweight adult female lying supine in bed in no distress.  Alert and oriented.  Pleasant demeanor.   VITAL SIGNS:  Blood pressure 142/72, heart rate 80s and regular, respirations nonlabored, temperature normal, O2 saturation 98% room air.   HEENT:  Demonstrates head to be atraumatic.  Extraocular movements full.  No nystagmus.  Pupils equal and reacting symmetrically.  Sclerae nonicteric.  Fundi deferred.  Oropharynx clear.  Dentition adequate repair.  Mucosal membranes are moist.  Carotid upstroke brisk.  No bruits.  There is no thyromegaly or lymphadenopathy.   SKIN:  No rash (exposed areas).  Superficial abrasion over the left knee anteriorly.   CHEST:  Clear lung fields.   CARDIAC:  Exam regular without audible gallop or murmur.  No click, no jugular venous distention.   BREASTS:  Exam deferred.   ABDOMEN:  Nondistended, soft, nontender, without palpable organomegaly or mass.  Bowel sounds are present.  No epigastric or iliac bruits.   EXTREMITIES:  Distal lower extremities are well perfused, no cyanosis or clubbing, no edema.   MUSCULOSKELETAL:  No posterior calf or thigh tenderness/induration to suggest DVT.   PELVIC/RECTAL:  Exam deferred.   NEUROLOGIC:  No facial asymmetry.  No lateralizing extremity weakness.  There is no tremor or rigidity.  Romberg and gait not presently tested.      LABORATORY DATA:  In  addition to the above includes serial blood sugar in the  range.  Last blood sugar of 98.  BMP this morning:  Sodium 149, potassium 3.6, chloride 112, CO2 27, BUN 8, creatinine 0.45, calcium 7.3 with reduced albumin of 3.1.      ASSESSMENT:  A 53-year-old female admitted with the followin.  Alcohol abuse/dependence, continuous.   2.  Potential for alcohol withdrawal without overt stigmata presently.   3.  History of pseudoseizures in addition to possible true withdrawal-related seizures (remote).   4.  Polysubstance abuse with U-tox positive for cocaine in addition to marijuana use.   5.  Gait instability secondary to #1 with a superficial abrasion, left knee.  No history of closed head injury.   6.  History of blood pressure elevation without diagnosis or treatment for hypertension.   7.  History of depression, on unclear regimen.  The patient indicates that she already has followup with a mental health provider.   8.  Normochromic normocytic anemia, exact etiology unclear.  May be nutritional, possible relation to alcohol excess.  No signs/symptoms to suggest recent gastrointestinal blood loss.   9.  History of alcohol-related pancreatitis, quiescent.   10.  PENICILLIN ALLERGY.   11.  Hypernatremia in the setting of alcohol excess.  Likely related to volume depletion with reduction in oral fluids.  No recent nausea, vomiting or diarrhea.  No clinical suggestion of something such as diabetes insipidus.      PLAN:   1.  Admit to medicine.   2.  Advance diet as tolerated.   3.  Continue intravenous fluid support with D5W.   4.  Trend sodium with serial followup labs.   5.  MSSA withdrawal protocol using Valium.   6.  Thiamine, multivitamin and folate.   7.  Evaluate anemia with iron studies, B12, folate and TSH.   8.  Mobilize as tolerated with staff monitoring gait stability.   9.  Seizure precautions.   10.  Anticipate discharge once detoxed and serum sodium corrected.  The patient declines a CD or  mental health evaluation.  We will ask pharmacy to try to determine prior to admission meds.         ANNA ALVARADO MD             D: 2018   T: 2018   MT: MAGALIE      Name:     GRAYSON ELDER   MRN:      [id number]        Account:      [id number]   :      1965        Admitted:     2018                   Document: [id number]

## 2018-07-01 NOTE — ED NOTES
"This RN was about to place IV when pt started shaking arms and legs while moaning for 40 seconds. Pt slumped after the shaking stopped and was unresponsive for 30 seconds, then woke saying \"Seizures, you didn't know.\" MD updated. Seizure pads in place.  "

## 2018-07-02 VITALS
DIASTOLIC BLOOD PRESSURE: 77 MMHG | TEMPERATURE: 98.3 F | HEART RATE: 80 BPM | SYSTOLIC BLOOD PRESSURE: 146 MMHG | OXYGEN SATURATION: 97 % | RESPIRATION RATE: 16 BRPM

## 2018-07-02 LAB
ANION GAP SERPL CALCULATED.3IONS-SCNC: 9 MMOL/L (ref 3–14)
BUN SERPL-MCNC: 8 MG/DL (ref 7–30)
CALCIUM SERPL-MCNC: 8.2 MG/DL (ref 8.5–10.1)
CHLORIDE SERPL-SCNC: 106 MMOL/L (ref 94–109)
CO2 SERPL-SCNC: 28 MMOL/L (ref 20–32)
CREAT SERPL-MCNC: 0.52 MG/DL (ref 0.52–1.04)
FOLATE SERPL-MCNC: 17.9 NG/ML
GFR SERPL CREATININE-BSD FRML MDRD: >90 ML/MIN/1.7M2
GLUCOSE SERPL-MCNC: 110 MG/DL (ref 70–99)
INTERPRETATION ECG - MUSE: NORMAL
IRON SATN MFR SERPL: 23 % (ref 15–46)
IRON SERPL-MCNC: 83 UG/DL (ref 35–180)
MAGNESIUM SERPL-MCNC: 1.9 MG/DL (ref 1.6–2.3)
MAGNESIUM SERPL-MCNC: 2.1 MG/DL (ref 1.6–2.3)
POTASSIUM SERPL-SCNC: 3.8 MMOL/L (ref 3.4–5.3)
SODIUM SERPL-SCNC: 143 MMOL/L (ref 133–144)
TIBC SERPL-MCNC: 354 UG/DL (ref 240–430)
VIT B12 SERPL-MCNC: 1149 PG/ML (ref 193–986)

## 2018-07-02 PROCEDURE — 82607 VITAMIN B-12: CPT | Performed by: INTERNAL MEDICINE

## 2018-07-02 PROCEDURE — 82746 ASSAY OF FOLIC ACID SERUM: CPT | Performed by: INTERNAL MEDICINE

## 2018-07-02 PROCEDURE — 36415 COLL VENOUS BLD VENIPUNCTURE: CPT | Performed by: INTERNAL MEDICINE

## 2018-07-02 PROCEDURE — 83735 ASSAY OF MAGNESIUM: CPT | Performed by: INTERNAL MEDICINE

## 2018-07-02 PROCEDURE — 25000132 ZZH RX MED GY IP 250 OP 250 PS 637: Performed by: INTERNAL MEDICINE

## 2018-07-02 PROCEDURE — 99207 ZZC NON-BILLABLE SERV PER CHARTING: CPT | Performed by: INTERNAL MEDICINE

## 2018-07-02 PROCEDURE — 80048 BASIC METABOLIC PNL TOTAL CA: CPT | Performed by: INTERNAL MEDICINE

## 2018-07-02 PROCEDURE — 83550 IRON BINDING TEST: CPT | Performed by: INTERNAL MEDICINE

## 2018-07-02 PROCEDURE — 83540 ASSAY OF IRON: CPT | Performed by: INTERNAL MEDICINE

## 2018-07-02 RX ORDER — NALTREXONE HYDROCHLORIDE 50 MG/1
50 TABLET, FILM COATED ORAL DAILY
COMMUNITY

## 2018-07-02 RX ORDER — VENLAFAXINE HYDROCHLORIDE 150 MG/1
300 CAPSULE, EXTENDED RELEASE ORAL DAILY
COMMUNITY

## 2018-07-02 RX ORDER — CETIRIZINE HYDROCHLORIDE 10 MG/1
10 TABLET ORAL DAILY
COMMUNITY

## 2018-07-02 RX ADMIN — LORATADINE 10 MG: 10 TABLET ORAL at 09:51

## 2018-07-02 RX ADMIN — FOLIC ACID 1 MG: 1 TABLET ORAL at 09:52

## 2018-07-02 RX ADMIN — Medication 100 MG: at 09:51

## 2018-07-02 RX ADMIN — MULTIPLE VITAMINS W/ MINERALS TAB 1 TABLET: TAB at 09:52

## 2018-07-02 ASSESSMENT — ACTIVITIES OF DAILY LIVING (ADL)
ADLS_ACUITY_SCORE: 8.5

## 2018-07-02 NOTE — PLAN OF CARE
"Problem: Patient Care Overview  Goal: Plan of Care/Patient Progress Review  Pt. discharged at 1030 via bus to homeless shelter.  Pt. was accompanied by self, and left with personal belongings.  Prior to discharge, PIV was removed.  Pt. received complete discharge paperwork, but threw the paperwork in the trash stating \"I don't need that\".  Pt. had no further questions at the time of discharge and no unmet needs were identified.      "

## 2018-07-02 NOTE — PROGRESS NOTES
Pt seen, case reviewed with nursing staff and Dr Chairez    Pt feels improved, is hungry, this am denies abd pain, nausea or emesis. + small BM last pm  No diazepam since 1800 last pm  States she is homeless, has been trying to reach friend with whom she may be able to stay    VSS  Afebrile  Alert, fully oriented  Lungs clear  CV rrr  Abd soft, non-tender  No LE edema  No tremors    Results for GRAYSON ELDER (MRN [id number]) as of 7/2/2018 08:04   Ref. Range 7/2/2018 07:21   Sodium Latest Ref Range: 133 - 144 mmol/L 143   Potassium Latest Ref Range: 3.4 - 5.3 mmol/L 3.8   Chloride Latest Ref Range: 94 - 109 mmol/L 106   Carbon Dioxide Latest Ref Range: 20 - 32 mmol/L 28   Urea Nitrogen Latest Ref Range: 7 - 30 mg/dL 8   Creatinine Latest Ref Range: 0.52 - 1.04 mg/dL 0.52   GFR Estimate Latest Ref Range: >60 mL/min/1.7m2 >90   GFR Estimate If Black Latest Ref Range: >60 mL/min/1.7m2 >90   Calcium Latest Ref Range: 8.5 - 10.1 mg/dL 8.2 (L)   Anion Gap Latest Ref Range: 3 - 14 mmol/L 9   Magnesium Latest Ref Range: 1.6 - 2.3 mg/dL 1.9   Iron Latest Ref Range: 35 - 180 ug/dL 83   Iron Binding Cap Latest Ref Range: 240 - 430 ug/dL 354   Iron Saturation Index Latest Ref Range: 15 - 46 % 23       Assessment    ETOH abuse, ongoing. No evidence for withdrawal.    Polysubstance use with U tox + for cocaine, benzos    Hypernatremia secondary to ETOH, resolved with IV fluids.  Po intake appears adequate.    NC/NC anemia, with nl Fe studies, ? ETOH, ? Nutritional    Gait instability in setting of ETOH abuse    Plan  Regular diet  Saline lock IV  Mobilize, monitor gait  Monitor for symptoms, signs of withdrawal  Possible d/c today--will have  see

## 2018-07-02 NOTE — PROVIDER NOTIFICATION
Contacted [facility] via telephone about pt mg (1.2) received order for replacement protocol and switch fluids to D5+1/2 NS+ KCL 20 mEq.

## 2018-07-02 NOTE — PROVIDER NOTIFICATION
Notified [facility] via text page of pt complaints of nausea and request for antiemetic. EKG obtained due to concerns about Hx of prolonged QT and zofran use- [facility] okayed zofran use (QT prolonged but within range that zofran use is safe per [facility]).

## 2018-07-02 NOTE — PLAN OF CARE
"Problem: Patient Care Overview  Goal: Plan of Care/Patient Progress Review  Outcome: No Change  VS: Vitals stable and within normal limits.    Output: Voiding spontaneously without difficulty. Passing flatus - small soft BM on evening shift.    Activity: Ambulating in room independently. Hx of falls, attendant at bedside- within arms reach when out of bed.    Skin: Intact.   Pain: Complains of all over \"achiness\" declines intervention.    Neuro/CMS: Intact. Alert and oriented X4.    Dressing(s): None   Diet: Regular    LDA: PIV infusing 20 mEq KCL + D5 + 1/2 NS at 75mL/hr   Equipment: IV pole   Plan: To be determined.    Additional Info: Mg 1.2 - replaced per protocol - 2.1 at recheck. EKG obtained due to concerns about Hx of prolonged QT and zofran use- [facility] okayed zofran use (QT prolonged but within range that zofran use is safe per [facility]) Attendant at bedside for safety. Able to make needs known. Will continue to monitor.            "

## 2018-07-02 NOTE — DISCHARGE SUMMARY
"Admit Date:     06/30/2018   Discharge Date:     07/02/2018      Kari Mackay is a 53-year-old female with a history of polysubstance abuse, pseudoseizures and generalized arthritis who was admitted to the medical unit from the ER for the evaluation of dehydration with hypernatremia in the setting of alcohol intoxication.  The patient was noted to have a sodium of 152 with a normal BUN and creatinine, as well as normal liver function tests.      The etiology of the hypernatremia was felt to be ethanol abuse with impaired water intake and the anti-ADH effects of alcohol.      HOSPITAL COURSE:  The patient was admitted to the medical unit and given dilute IV fluids.  With these treatments, her sodium did correct to the low 140s within 24 hours of admission.  The patient's overall medical status was stable and the patient became more alert and was able to tolerate a regular diet at the time of discharge. She remained hemodynamically stable.  Laboratory studies as above confirm normalization of her sodium.  Iron studies were obtained and were normal, to evaluate a hemoglobin of 9.5 with normochromic, normocytic indices.  The anemia is felt most likely secondary to nutritional factors.      The patient did not exhibit evidence of significant alcohol withdrawal.  She consistently refused an option for outpatient CD referral, instead, questioned discharge to home with a friend.  Nursing notes indicate that the patient received a complete discharge paperwork but threw the paperwork in the trash stating \"I don't need that.\"      DISCHARGE DIAGNOSES:   1.  Ethanol dependency with intoxication on admission.   2.  Hyponatremia secondary to dehydration with poor oral intake, resolved.   3.  Anemia, normochromic, normocytic, likely secondary to nutritional factors, with unremarkable iron studies obtained during this hospitalization.   4.  History of polysubstance abuse with urine toxicology screen positive for cocaine, " benzodiazepines on admission.      The patient was discharged from the hospital.  She intends lives with a friend.  She does have medications at the shelter that she just left and will pick them up when she leaves the hospital.      DISCHARGE MEDICATIONS:   1.  Calcium with vitamin D 600/400, 1 tablet twice daily.   2.  Zyrtec 10 mg daily.   3.  Vitamin B12 1000 mcg daily.   4.  Flonase 1 sniff each nostril daily.   5.  Multivitamins once a day.   6.  Naltrexone 50 mg daily.   7.  Effexor XR 75 mg daily.        The patient does have followup appointments with multiple providers at the [facility].  She was encouraged to attend these appointments.         JOSE KLEIN MD             D: 2018   T: 2018   MT: NAVNEET      Name:     GRAYSON ELDER   MRN:      [id number]        Account:        [id number]   :      1965           Admit Date:     2018                                  Discharge Date: 2018      Document: [id number]

## 2018-07-02 NOTE — PLAN OF CARE
"Problem: Patient Care Overview  Goal: Plan of Care/Patient Progress Review  Outcome: No Change    VS:   VSS.   Output:   Voiding spontaneously without difficulty.  Pt reports several BMs today, states a couple of them were loose.   Activity:   Up independently appears fairly steady on feet.   Pt reports history of falls, 1:1 sitter within arms reach when pt out of bed.   Skin: Intact.   Pain:   C/o generalized pain \"all over\", pt declined medications nor interventions, states this is baseline and \"nothing works\".   Neuro/CMS:   Alert and oriented x4. Neuros/cms intact.   Dressing(s):   N/A.   Diet:   Tolerating regular diet and fluids, good appetite.   LDA:   PIV infusing with D5W.   Equipment:   N/A.   Plan:   Continue with POC.    Additional Info:   MSSA scored 4, 6, 8, and 8; given valium per MSSA protocol     (see eMAR).  Na 149 this morning, receiving IV D5W continuously.   No seizure activity noted.   1:1 sitter present for safety.            "

## 2018-07-05 ENCOUNTER — HOSPITAL ENCOUNTER (EMERGENCY)
Facility: CLINIC | Age: 53
Discharge: HOME | End: 2018-07-05
Attending: FAMILY MEDICINE | Admitting: FAMILY MEDICINE
Payer: COMMERCIAL

## 2018-07-05 ENCOUNTER — HOSPITAL ENCOUNTER (EMERGENCY)
Facility: CLINIC | Age: 53
Discharge: OTHER INSTITUTION | End: 2018-07-06
Attending: EMERGENCY MEDICINE | Admitting: EMERGENCY MEDICINE
Payer: COMMERCIAL

## 2018-07-05 VITALS
TEMPERATURE: 97.9 F | DIASTOLIC BLOOD PRESSURE: 90 MMHG | SYSTOLIC BLOOD PRESSURE: 120 MMHG | RESPIRATION RATE: 16 BRPM | OXYGEN SATURATION: 100 %

## 2018-07-05 DIAGNOSIS — F10.220 ACUTE ALCOHOLIC INTOXICATION IN ALCOHOLISM WITHOUT COMPLICATION (H): ICD-10-CM

## 2018-07-05 DIAGNOSIS — R46.89 VERBALLY ABUSIVE BEHAVIOR: ICD-10-CM

## 2018-07-05 LAB
ALCOHOL BREATH TEST: 0.25 (ref 0–0.01)
ALCOHOL BREATH TEST: 0.29 (ref 0–0.01)

## 2018-07-05 PROCEDURE — 99284 EMERGENCY DEPT VISIT MOD MDM: CPT | Mod: Z6 | Performed by: EMERGENCY MEDICINE

## 2018-07-05 PROCEDURE — 80320 DRUG SCREEN QUANTALCOHOLS: CPT | Performed by: FAMILY MEDICINE

## 2018-07-05 PROCEDURE — 99283 EMERGENCY DEPT VISIT LOW MDM: CPT | Performed by: FAMILY MEDICINE

## 2018-07-05 PROCEDURE — 99283 EMERGENCY DEPT VISIT LOW MDM: CPT | Mod: Z6 | Performed by: FAMILY MEDICINE

## 2018-07-05 PROCEDURE — 82075 ASSAY OF BREATH ETHANOL: CPT | Performed by: EMERGENCY MEDICINE

## 2018-07-05 PROCEDURE — 99285 EMERGENCY DEPT VISIT HI MDM: CPT | Performed by: EMERGENCY MEDICINE

## 2018-07-05 PROCEDURE — 80307 DRUG TEST PRSMV CHEM ANLYZR: CPT | Performed by: FAMILY MEDICINE

## 2018-07-05 NOTE — ED PROVIDER NOTES
"  History     Chief Complaint   Patient presents with     Alcohol Intoxication     Pt was passed out on the sidewalk     HPI  Kari Mackay is a 53 year old female who was found sleeping on the street, intoxicated. Recent admission to [facility] for similar with dc on 7/2/18. No concerns about trauma.  She is on no medications- drinking too much to take meds. No concerns for overdose. Brought by the paramedics.  When asked why she is here- \"fuck you\" was the reply.    I have reviewed the Medications, Allergies, Past Medical and Surgical History, and Social History in the Epic system.  No medications  Chronic and unrelenting alcohol abuse  Apparently a hx of pseudoseizures  arthritis  Denies street drug usage  No medications  Review of Systems   Reason unable to perform ROS: too intoxicated and pt is beliggerent.       Physical Exam   BP: 93/69  Heart Rate: 75  Temp: 97.9  F (36.6  C)  Resp: 16  SpO2: 96 %      Physical Exam   Constitutional: No distress.   The pt is sleepy but easily protects her airway and when repeatedly asked by me in a very loud fashion states \"get the fuck away from me\".   HENT:   Head: Normocephalic and atraumatic.   Cardiovascular: Normal rate, regular rhythm, normal heart sounds and intact distal pulses.    No murmur heard.  Pulmonary/Chest: Breath sounds normal.   Abdominal: Soft. She exhibits no mass. There is no tenderness.   Liver enlarged but not tender   Musculoskeletal: She exhibits no edema.   Skin: She is not diaphoretic.   Psychiatric:   No apparent suicidal ideations or homicidal ideations  No delusions or hallucinations   Nursing note and vitals reviewed.      ED Course     ED Course     Procedures            Labs Ordered and Resulted from Time of ED Arrival Up to the Time of Departure from the ED   ALCOHOL BREATH TEST POCT - Abnormal; Notable for the following:        Result Value    Alcohol Breath Test 0.25 (*)     All other components within normal limits            Assessments & " "Plan (with Medical Decision Making)   The pt was allowed to sleep and metabolize. By 2 pm she was up and about - her normal self. She has chronic ambulatory problems with arthritis. She ate and drank water and is now clinically sober and discharged- she doesn't want any etoh treatment or help and in fact told me to \"get fucked\". She ambulated with a cane- normal for patient.    The pt has chronic and severe alcholism with many ed visits. She does not want help for the etoh.    I have reviewed the nursing notes.    I have reviewed the findings, diagnosis, plan and need for follow up with the patient.    Discharge Medication List as of 7/5/2018  2:58 PM          Final diagnoses:   Acute alcoholic intoxication in alcoholism without complication (H)       7/5/2018   [facility], [place], EMERGENCY DEPARTMENT     Berhane Brantley MD  07/08/18 [id number]    "

## 2018-07-05 NOTE — ED AVS SNAPSHOT
[facility], Emergency Department    [address]    [place] [address]    Phone:  [phone]    Fax:  [phone]                                       Kari Mackay   MRN: [id number]    Department:  [facility], Emergency Department   Date of Visit:  7/5/2018           Patient Information     Date Of Birth          1965        Your diagnoses for this visit were:     Acute alcoholic intoxication in alcoholism without complication (H)        You were seen by Arielle Lennon MD.        Discharge Instructions         Recovering from Addiction    Recovery means making a new life for yourself. This includes finding new interests. It involves building new relationships. It means taking better care of yourself. These will all help you replace substance use with a new and healthier life. They will also help you avoid the things that could make you want to use again.  Make lifestyle changes  A big part of recovery is changing habits. These are habits that may have led to your substance abuse. It s also a time for personal growth. Below are some changes you may want to make.    Find new activities and goals. You may want to try new hobbies and interests. Or, you may want to join an activity group to meet new people.    Build relationships. You may choose to spend more time with loved ones you lost touch with while you were using. You may also want to make new friends. And there may be some friends or family members you will not want to see because they are still using.    Exercise and eat well. Get some physical activity on most days. This can help you feel better. Eat healthy meals with lots of fruits, vegetables, and whole grains. This can also help your well-being. A nutritionist or fitness expert can help you.    Maintain ties with medical and addiction professionals. While you may not need intense professional support, it is important to have reliable safety nets so you can access  professional assistance when needed.    Relax and get enough sleep. Good sleep can help you feel better. So can less stress. Ask your counselor about relaxation exercises. These may include meditation. Also ask about stress management classes.  Date Last Reviewed: 2/1/2017 2000-2017 [employer]. [address] [place] [address]. All rights reserved. This information is not intended as a substitute for professional medical care. Always follow your healthcare professional's instructions.          24 Hour Appointment Hotline       To make an appointment at any [facility], call [phone] ([phone]). If you don't have a family doctor or clinic, we will help you find one. [facility] clinics are conveniently located to serve the needs of you and your family.             Review of your medicines      CONTINUE these medicines which may have CHANGED, or have new prescriptions. If we are uncertain of the size of tablets/capsules you have at home, strength may be listed as something that might have changed.        Dose / Directions Last dose taken    * venlafaxine 75 MG 24 hr capsule   Commonly known as:  EFFEXOR-XR   Dose:  75 mg   What changed:  Another medication with the same name was added. Make sure you understand how and when to take each.        Take 75 mg by mouth daily   Refills:  0        * venlafaxine 75 MG Tb24 24 hr tablet   Commonly known as:  EFFEXOR-ER   Dose:  75 mg   What changed:  You were already taking a medication with the same name, and this prescription was added. Make sure you understand how and when to take each.   Quantity:  3 tablet        Take 1 tablet (75 mg) by mouth daily   Refills:  3        * Notice:  This list has 2 medication(s) that are the same as other medications prescribed for you. Read the directions carefully, and ask your doctor or other care provider to review them with you.      Our records show that you are taking the medicines listed below.  If these are incorrect, please call your family doctor or clinic.        Dose / Directions Last dose taken    calcium-vitamin D 600-400 MG-UNIT per tablet   Commonly known as:  CALTRATE   Dose:  1 tablet        Take 1 tablet by mouth 2 times daily   Refills:  0        cetirizine 10 MG tablet   Commonly known as:  zyrTEC   Dose:  10 mg        Take 10 mg by mouth daily   Refills:  0        CYANOCOBALAMIN PO   Dose:  1000 mcg        Take 1,000 mcg by mouth   Refills:  0        fluticasone 27.5 MCG/SPRAY spray   Commonly known as:  VERAMYST   Dose:  1 spray        Spray 1 spray into both nostrils daily   Refills:  0        multivitamin, therapeutic with minerals Tabs tablet   Dose:  1 tablet   Quantity:  30 each        Take 1 tablet by mouth daily   Refills:  0        NALTREXONE HCL PO   Dose:  50 mg        Take 50 mg by mouth   Refills:  0                Prescriptions were sent or printed at these locations (1 Prescription)                   Other Prescriptions                Printed at Department/Unit printer (1 of 1)         venlafaxine (EFFEXOR-ER) 75 MG TB24 24 hr tablet                Procedures and tests performed during your visit     Alcohol breath test POCT    Drug abuse screen 6 urine (chem dep)      Orders Needing Specimen Collection     None      Pending Results     No orders found for last 3 day(s).            Pending Culture Results     No orders found for last 3 day(s).            Pending Results Instructions     If you had any lab results that were not finalized at the time of your Discharge, you can call the ED Lab Result RN at [phone]. You will be contacted by this team for any positive Lab results or changes in treatment. The nurses are available 7 days a week from 10A to 6:30P.  You can leave a message 24 hours per day and they will return your call.        Thank you for choosing [facility]       Thank you for choosing [facility] for your care. Our goal is always to provide you with excellent care.  "Hearing back from our patients is one way we can continue to improve our services. Please take a few minutes to complete the written survey that you may receive in the mail after you visit with us. Thank you!        [email or web address] Information     [employer] lets you send messages to your doctor, view your test results, renew your prescriptions, schedule appointments and more. To sign up, go to [email or web address] . Click on \"Log in\" on the left side of the screen, which will take you to the Welcome page. Then click on \"Sign up Now\" on the right side of the page.     You will be asked to enter the access code listed below, as well as some personal information. Please follow the directions to create your username and password.     Your access code is: [email or web address]  Expires: 2018  6:24 AM     Your access code will  in 90 days. If you need help or a new code, please call your [facility] clinic or [phone].        Care EveryWhere ID     This is your Care EveryWhere ID. This could be used by other organizations to access your [facility] medical records  [id number]        Equal Access to Services     [facility] COSTA : Hadblake Miranda, talat ponce, benito tavarez, sony barry . So [facility] [phone].    ATENCIÓN: Si habla español, tiene a vazquez disposición servicios gratuitos de asistencia lingüística. [place] al [phone].    We comply with applicable federal civil rights laws and [place] laws. We do not discriminate on the basis of race, color, national origin, age, disability, sex, sexual orientation, or gender identity.            After Visit Summary       This is your record. Keep this with you and show to your community pharmacist(s) and doctor(s) at your next visit.                  "

## 2018-07-05 NOTE — ED AVS SNAPSHOT
[facility], [place], Emergency Department    [address]    [facility] [address]    Phone:  [phone]    Fax:  [phone]                                       Kari Mackay   MRN: [id number]    Department:  [facility], Emergency Department   Date of Visit:  7/5/2018           After Visit Summary Signature Page     I have received my discharge instructions, and my questions have been answered. I have discussed any challenges I see with this plan with the nurse or doctor.    ..........................................................................................................................................  Patient/Patient Representative Signature      ..........................................................................................................................................  Patient Representative Print Name and Relationship to Patient    ..................................................               ................................................  Date                                            Time    ..........................................................................................................................................  Reviewed by Signature/Title    ...................................................              ..............................................  Date                                                            Time

## 2018-07-05 NOTE — DISCHARGE INSTRUCTIONS
Suggest that you drink in the confines of your home and not in public.   If you want help with your severe alcoholism- suggest that you call - this is detox at [facility]- the specialist that you speak with can tell you if there is a bed and if there is a bed, it can be held for you. This to allow you to come back to the emergency room for detox admission

## 2018-07-05 NOTE — ED NOTES
Bed: ED07  Expected date: 7/5/18  Expected time: 10:00 AM  Means of arrival: Ambulance  Comments:  [facility]  53  Female  ETOH

## 2018-07-05 NOTE — ED AVS SNAPSHOT
[facility], [place], Emergency Department    [address]    [facility] [address]    Phone:  [phone]    Fax:  [phone]                                       Kari Mackay   MRN: [id number]    Department:  [facility], Emergency Department   Date of Visit:  7/5/2018           After Visit Summary Signature Page     I have received my discharge instructions, and my questions have been answered. I have discussed any challenges I see with this plan with the nurse or doctor.    ..........................................................................................................................................  Patient/Patient Representative Signature      ..........................................................................................................................................  Patient Representative Print Name and Relationship to Patient    ..................................................               ................................................  Date                                            Time    ..........................................................................................................................................  Reviewed by Signature/Title    ...................................................              ..............................................  Date                                                            Time

## 2018-07-05 NOTE — ED AVS SNAPSHOT
[facility], Emergency Department    [address] [place] [address]    Phone:  [phone]    Fax:  [phone]                                       Kari Mackay   MRN: [id number]    Department:  [facility], Emergency Department   Date of Visit:  7/5/2018           Patient Information     Date Of Birth          1965        Your diagnoses for this visit were:     Acute alcoholic intoxication in alcoholism without complication (H)        You were seen by Berhane Brantley MD.        Discharge Instructions       Suggest that you drink in the confines of your home and not in public.   If you want help with your severe alcoholism- suggest that you call - this is detox at [facility]- the specialist that you speak with can tell you if there is a bed and if there is a bed, it can be held for you. This to allow you to come back to the emergency room for detox admission    24 Hour Appointment Hotline       To make an appointment at any [facility] clinic, call [phone] ([phone]). If you don't have a family doctor or clinic, we will help you find one. [facility] clinics are conveniently located to serve the needs of you and your family.             Review of your medicines      Our records show that you are taking the medicines listed below. If these are incorrect, please call your family doctor or clinic.        Dose / Directions Last dose taken    calcium-vitamin D 600-400 MG-UNIT per tablet   Commonly known as:  CALTRATE   Dose:  1 tablet        Take 1 tablet by mouth 2 times daily   Refills:  0        cetirizine 10 MG tablet   Commonly known as:  zyrTEC   Dose:  10 mg        Take 10 mg by mouth daily   Refills:  0        CYANOCOBALAMIN PO   Dose:  1000 mcg        Take 1,000 mcg by mouth   Refills:  0        fluticasone 27.5 MCG/SPRAY spray   Commonly known as:  VERAMYST   Dose:  1 spray        Spray 1 spray into both nostrils daily   Refills:  0        multivitamin, therapeutic  "with minerals Tabs tablet   Dose:  1 tablet   Quantity:  30 each        Take 1 tablet by mouth daily   Refills:  0        NALTREXONE HCL PO   Dose:  50 mg        Take 50 mg by mouth   Refills:  0        venlafaxine 75 MG 24 hr capsule   Commonly known as:  EFFEXOR-XR   Dose:  75 mg        Take 75 mg by mouth daily   Refills:  0                Procedures and tests performed during your visit     Alcohol breath test POCT      Orders Needing Specimen Collection     None      Pending Results     No orders found from 7/3/2018 to 7/6/2018.            Pending Culture Results     No orders found from 7/3/2018 to 7/6/2018.            Pending Results Instructions     If you had any lab results that were not finalized at the time of your Discharge, you can call the ED Lab Result RN at [phone]. You will be contacted by this team for any positive Lab results or changes in treatment. The nurses are available 7 days a week from 10A to 6:30P.  You can leave a message 24 hours per day and they will return your call.        Thank you for choosing [facility]       Thank you for choosing [facility] for your care. Our goal is always to provide you with excellent care. Hearing back from our patients is one way we can continue to improve our services. Please take a few minutes to complete the written survey that you may receive in the mail after you visit with us. Thank you!        [employer] Information     [facility] lets you send messages to your doctor, view your test results, renew your prescriptions, schedule appointments and more. To sign up, go to [email or web address] . Click on \"Log in\" on the left side of the screen, which will take you to the Welcome page. Then click on \"Sign up Now\" on the right side of the page.     You will be asked to enter the access code listed below, as well as some personal information. Please follow the directions to create your username and password.     Your access code is: [email or web address]  Expires: " 2018  6:24 AM     Your access code will  in 90 days. If you need help or a new code, please call your [facility] clinic or [phone].        Care EveryWhere ID     This is your Care EveryWhere ID. This could be used by other organizations to access your [facility] medical records  [id number]        Equal Access to Services     JAMAAL SKELTON : Abilio Miranda, wabalta ponce, benito nevesalivis tavarez, sony barry . So [facility] [phone].    ATENCIÓN: Si habla español, tiene a vazquez disposición servicios gratuitos de asistencia lingüística. Llame al [phone].    We comply with applicable federal civil rights laws and [place] laws. We do not discriminate on the basis of race, color, national origin, age, disability, sex, sexual orientation, or gender identity.            After Visit Summary       This is your record. Keep this with you and show to your community pharmacist(s) and doctor(s) at your next visit.

## 2018-07-06 VITALS
TEMPERATURE: 97.1 F | DIASTOLIC BLOOD PRESSURE: 88 MMHG | OXYGEN SATURATION: 100 % | HEART RATE: 84 BPM | SYSTOLIC BLOOD PRESSURE: 126 MMHG

## 2018-07-06 LAB
AMPHETAMINES UR QL SCN: NEGATIVE
BARBITURATES UR QL: NEGATIVE
BENZODIAZ UR QL: POSITIVE
CANNABINOIDS UR QL SCN: NEGATIVE
COCAINE UR QL: NEGATIVE
ETHANOL UR QL SCN: POSITIVE
OPIATES UR QL SCN: NEGATIVE

## 2018-07-06 RX ORDER — VENLAFAXINE HYDROCHLORIDE 75 MG/1
75 TABLET, EXTENDED RELEASE ORAL DAILY
Qty: 3 TABLET | Refills: 3 | Status: ON HOLD | OUTPATIENT
Start: 2018-07-06 | End: 2022-06-14

## 2018-07-06 ASSESSMENT — ENCOUNTER SYMPTOMS
FEVER: 0
SHORTNESS OF BREATH: 0
DIARRHEA: 0
ABDOMINAL PAIN: 0
VOMITING: 0

## 2018-07-06 NOTE — ED PROVIDER NOTES
History     Chief Complaint   Patient presents with     Alcohol Intoxication     [place] bus stop. EMS brought in. Appears intoxicated     The history is limited by the condition of the patient (intoxicated).     Kari Mackay is a 53 year old female who was brought in after she was apparently found at a bus stop intoxicated. She is grossly intoxicated here, provides very little history. She denies any fall or other traumas today. She states she feels fine, specifically denies SOB, fever, abdominal pain, vomiting, diarrhea. She is not suicidal. Per chart review, she has had multiple visits for intoxication.     Past Medical History:   Diagnosis Date     Alcohol dependence with withdrawal (H)     multiple detox attempts     Osteoarthritis of right knee      Seizures (H)     alcohol withdrawal     Substance abuse        Past Surgical History:   Procedure Laterality Date     GALLBLADDER SURGERY       GYN SURGERY         No family history on file.    Social History   Substance Use Topics     Smoking status: Current Every Day Smoker     Packs/day: 0.25     Smokeless tobacco: Never Used     Alcohol use Yes      Comment: Daily 1-2 liters per day       No current facility-administered medications for this encounter.      Current Outpatient Prescriptions   Medication     venlafaxine (EFFEXOR-ER) 75 MG TB24 24 hr tablet     calcium-vitamin D (CALTRATE) 600-400 MG-UNIT per tablet     cetirizine (ZYRTEC) 10 MG tablet     CYANOCOBALAMIN PO     fluticasone (VERAMYST) 27.5 MCG/SPRAY spray     multivitamin, therapeutic with minerals (THERA-VIT-M) TABS     NALTREXONE HCL PO     venlafaxine (EFFEXOR-XR) 75 MG 24 hr capsule        Allergies   Allergen Reactions     Acetaminophen      Penicillins Other (See Comments)     Doesn't know     Percocet [Oxycodone-Acetaminophen]          I have reviewed the Medications, Allergies, Past Medical and Surgical History, and Social History in the Epic system.    Review of Systems    Constitutional: Negative for fever.   Respiratory: Negative for shortness of breath.    Gastrointestinal: Negative for abdominal pain, diarrhea and vomiting.   Psychiatric/Behavioral: Negative for suicidal ideas.   All other systems reviewed and are negative.      Physical Exam   BP: 126/88  Pulse: 84  Temp: 94.6  F (34.8  C)  SpO2: 100 %      Physical Exam   Constitutional: No distress.   Grossly intoxicated   HENT:   Head: Atraumatic.   Eyes: Pupils are equal, round, and reactive to light. No scleral icterus.   Cardiovascular: Normal heart sounds and intact distal pulses.    Pulmonary/Chest: Breath sounds normal. No respiratory distress.   Abdominal: Soft. There is no tenderness.   Musculoskeletal: She exhibits no edema or tenderness.   Skin: Skin is warm. No rash noted. She is not diaphoretic.       ED Course     ED Course     Procedures             Critical Care time:  none             Labs Ordered and Resulted from Time of ED Arrival Up to the Time of Departure from the ED   DRUG ABUSE SCREEN 6 CHEM DEP URINE ([facility]) - Abnormal; Notable for the following:        Result Value    Benzodiazepine Qual Urine Positive (*)     Ethanol Qual Urine Positive (*)     All other components within normal limits   ALCOHOL BREATH TEST POCT - Abnormal; Notable for the following:     Alcohol Breath Test 0.293 (*)     All other components within normal limits            Assessments & Plan (with Medical Decision Making)   The patient is grossly intoxicated.  She does not present any acute complaints today, denies injury.  There are no beds available at either [facility], or [facility].  There is a bed available at the [facility], the patient be transferred there for acute detoxification.    Dictation Disclaimer: Some of this Note has been completed with voice-recognition dictation software. Although errors are generally corrected real-time, there is the potential for a rare error to be present in the  completed chart.      I have reviewed the nursing notes.    I have reviewed the findings, diagnosis, plan and need for follow up with the patient.    Discharge Medication List as of 7/6/2018  4:03 AM      START taking these medications    Details   venlafaxine (EFFEXOR-ER) 75 MG TB24 24 hr tablet Take 1 tablet (75 mg) by mouth daily, Disp-3 tablet, R-3, Local Print             Final diagnoses:   Acute alcoholic intoxication in alcoholism without complication (H)   I, Abisai Garcia, am serving as a trained medical scribe to document services personally performed by Arielle Lennon MD, based on the provider's statements to me.   I, Arielle Lennon MD, was physically present and have reviewed and verified the accuracy of this note documented by Abisai Garcia.      7/5/2018   [facility], [place], EMERGENCY DEPARTMENT     Arielle Lennon MD  07/06/18 0612

## 2018-07-19 ENCOUNTER — HOSPITAL ENCOUNTER (EMERGENCY)
Facility: CLINIC | Age: 53
Discharge: HOME | End: 2018-07-20
Attending: EMERGENCY MEDICINE | Admitting: EMERGENCY MEDICINE
Payer: COMMERCIAL

## 2018-07-19 ENCOUNTER — APPOINTMENT (OUTPATIENT)
Dept: GENERAL RADIOLOGY | Facility: CLINIC | Age: 53
End: 2018-07-19
Attending: EMERGENCY MEDICINE
Payer: COMMERCIAL

## 2018-07-19 DIAGNOSIS — G89.29 CHRONIC PAIN OF RIGHT KNEE: ICD-10-CM

## 2018-07-19 DIAGNOSIS — F10.220 ACUTE ALCOHOLIC INTOXICATION IN ALCOHOLISM WITHOUT COMPLICATION (H): ICD-10-CM

## 2018-07-19 DIAGNOSIS — M25.561 CHRONIC PAIN OF RIGHT KNEE: ICD-10-CM

## 2018-07-19 LAB — ALCOHOL BREATH TEST: 0.24 (ref 0–0.01)

## 2018-07-19 PROCEDURE — 73560 X-RAY EXAM OF KNEE 1 OR 2: CPT | Mod: RT

## 2018-07-19 PROCEDURE — 99283 EMERGENCY DEPT VISIT LOW MDM: CPT | Performed by: EMERGENCY MEDICINE

## 2018-07-19 PROCEDURE — 99284 EMERGENCY DEPT VISIT MOD MDM: CPT | Mod: Z6 | Performed by: EMERGENCY MEDICINE

## 2018-07-19 PROCEDURE — 82075 ASSAY OF BREATH ETHANOL: CPT | Performed by: EMERGENCY MEDICINE

## 2018-07-19 NOTE — ED AVS SNAPSHOT
[facility], Emergency Department    [address]    [place] [address]    Phone:  [phone]    Fax:  [phone]                                       Kari Mackay   MRN: [id number]    Department:  [facility], Emergency Department   Date of Visit:  7/19/2018           Patient Information     Date Of Birth          1965        Your diagnoses for this visit were:     Acute alcoholic intoxication in alcoholism without complication (H)     Chronic pain of right knee        You were seen by Gelacio Rainey MD.      Follow-up Information     Follow up with Jackie Kendrick MD.    Contact information:    [facility]  [address]  [facility] [address] [phone]          Discharge Instructions       Avoid excessive alcohol use.  Do not drive.  Follow up this week with your primary clinic provider.  Follow up also with chemical dependency services at [phone]    24 Hour Appointment Hotline       To make an appointment at any [facility], call [phone] ([phone]). If you don't have a family doctor or clinic, we will help you find one. [place] clinics are conveniently located to serve the needs of you and your family.             Review of your medicines      START taking        Dose / Directions Last dose taken    ibuprofen 600 MG tablet   Commonly known as:  ADVIL/MOTRIN   Dose:  600 mg   Quantity:  40 tablet        Take 1 tablet (600 mg) by mouth every 6 hours as needed for moderate pain   Refills:  0          Our records show that you are taking the medicines listed below. If these are incorrect, please call your family doctor or clinic.        Dose / Directions Last dose taken    calcium-vitamin D 600-400 MG-UNIT per tablet   Commonly known as:  CALTRATE   Dose:  1 tablet        Take 1 tablet by mouth 2 times daily   Refills:  0        cetirizine 10 MG tablet   Commonly known as:  zyrTEC   Dose:  10 mg        Take 10 mg by mouth daily    Refills:  0        CYANOCOBALAMIN PO   Dose:  1000 mcg        Take 1,000 mcg by mouth   Refills:  0        fluticasone 27.5 MCG/SPRAY spray   Commonly known as:  VERAMYST   Dose:  1 spray        Spray 1 spray into both nostrils daily   Refills:  0        multivitamin, therapeutic with minerals Tabs tablet   Dose:  1 tablet   Quantity:  30 each        Take 1 tablet by mouth daily   Refills:  0        NALTREXONE HCL PO   Dose:  50 mg        Take 50 mg by mouth   Refills:  0        * venlafaxine 75 MG 24 hr capsule   Commonly known as:  EFFEXOR-XR   Dose:  75 mg        Take 75 mg by mouth daily   Refills:  0        * venlafaxine 75 MG Tb24 24 hr tablet   Commonly known as:  EFFEXOR-ER   Dose:  75 mg   Quantity:  3 tablet        Take 1 tablet (75 mg) by mouth daily   Refills:  3        * Notice:  This list has 2 medication(s) that are the same as other medications prescribed for you. Read the directions carefully, and ask your doctor or other care provider to review them with you.            Prescriptions were sent or printed at these locations (1 Prescription)                   Other Prescriptions                Printed at Department/Unit printer (1 of 1)         ibuprofen (ADVIL/MOTRIN) 600 MG tablet                Procedures and tests performed during your visit     Alcohol breath test POCT    XR Knee Right 1/2 Views      Orders Needing Specimen Collection     None      Pending Results     No orders found for last 3 day(s).            Pending Culture Results     No orders found for last 3 day(s).            Pending Results Instructions     If you had any lab results that were not finalized at the time of your Discharge, you can call the ED Lab Result RN at [phone]. You will be contacted by this team for any positive Lab results or changes in treatment. The nurses are available 7 days a week from 10A to 6:30P.  You can leave a message 24 hours per day and they will return your call.        Thank you for  "choosing [facility]       Thank you for choosing [facility] for your care. Our goal is always to provide you with excellent care. Hearing back from our patients is one way we can continue to improve our services. Please take a few minutes to complete the written survey that you may receive in the mail after you visit with us. Thank you!        [employer] Information     [employer] lets you send messages to your doctor, view your test results, renew your prescriptions, schedule appointments and more. To sign up, go to [email or web address] . Click on \"Log in\" on the left side of the screen, which will take you to the Welcome page. Then click on \"Sign up Now\" on the right side of the page.     You will be asked to enter the access code listed below, as well as some personal information. Please follow the directions to create your username and password.     Your access code is: [email or web address]  Expires: 2018  6:24 AM     Your access code will  in 90 days. If you need help or a new code, please call your [facility] clinic or [phone].        Care EveryWhere ID     This is your Care EveryWhere ID. This could be used by other organizations to access your [facility] medical records  [id number]        Equal Access to Services     JAMAAL SKELTON : Abilio Miranda, wafilemonda rosario, qaybta kaalmada daysi, sony valencia. So [facility] [phone].    ATENCIÓN: Si habla español, tiene a vazquez disposición servicios gratuitos de asistencia lingüística. Llame al [phone].    We comply with applicable federal civil rights laws and [place] laws. We do not discriminate on the basis of race, color, national origin, age, disability, sex, sexual orientation, or gender identity.            After Visit Summary       This is your record. Keep this with you and show to your community pharmacist(s) and doctor(s) at your next visit.                  "

## 2018-07-19 NOTE — ED AVS SNAPSHOT
[facility], [place], Emergency Department    [address]    [place] [address]    Phone:  [phone]    Fax:  [phone]                                       Kari Mackay   MRN: [id number]    Department:  [facility], Emergency Department   Date of Visit:  7/19/2018           After Visit Summary Signature Page     I have received my discharge instructions, and my questions have been answered. I have discussed any challenges I see with this plan with the nurse or doctor.    ..........................................................................................................................................  Patient/Patient Representative Signature      ..........................................................................................................................................  Patient Representative Print Name and Relationship to Patient    ..................................................               ................................................  Date                                            Time    ..........................................................................................................................................  Reviewed by Signature/Title    ...................................................              ..............................................  Date                                                            Time

## 2018-07-20 VITALS
OXYGEN SATURATION: 94 % | TEMPERATURE: 96.7 F | DIASTOLIC BLOOD PRESSURE: 76 MMHG | SYSTOLIC BLOOD PRESSURE: 127 MMHG | HEART RATE: 98 BPM | RESPIRATION RATE: 17 BRPM

## 2018-07-20 PROCEDURE — 25000132 ZZH RX MED GY IP 250 OP 250 PS 637: Performed by: EMERGENCY MEDICINE

## 2018-07-20 RX ORDER — IBUPROFEN 600 MG/1
600 TABLET, FILM COATED ORAL EVERY 6 HOURS PRN
Qty: 40 TABLET | Refills: 0 | Status: ON HOLD | OUTPATIENT
Start: 2018-07-20 | End: 2022-06-14

## 2018-07-20 RX ORDER — IBUPROFEN 600 MG/1
600 TABLET, FILM COATED ORAL ONCE
Status: COMPLETED | OUTPATIENT
Start: 2018-07-20 | End: 2018-07-20

## 2018-07-20 RX ADMIN — IBUPROFEN 600 MG: 600 TABLET ORAL at 07:17

## 2018-07-20 NOTE — ED TRIAGE NOTES
Patient brought to ED via EMS she was found intoxicated in public. Patient is interested in detox, she wants to go to [facility].

## 2018-07-20 NOTE — ED NOTES
Observed several hours in the ED. This morning she is awake and alert. She is tolerating oral fluids. She is able to ambulate with her cane. Will discharge as per previous plan.      Rodney Shay MD  07/20/18 0701

## 2018-07-20 NOTE — DISCHARGE INSTRUCTIONS
Avoid excessive alcohol use.  Do not drive.  Follow up this week with your primary clinic provider.  Follow up also with chemical dependency services at [phone]

## 2018-07-20 NOTE — ED NOTES
Bed: ED09  Expected date:   Expected time:   Means of arrival:   Comments:  Shannen [address]    54 yo F  intoxicated

## 2018-07-20 NOTE — ED PROVIDER NOTES
History     Chief Complaint   Patient presents with     Alcohol Intoxication     Called 911 to be taken to detox. However, patient too intoxicated, unable to ambulate independently/care for safe. Asked to be brought to the ED.      HPI  Kari Mackay is a 53 year old female who presents to the ER via ambulance grossly intoxicated and complaining of right knee pain.  Patient unable to give much history secondary to her level of intoxication.  Patient has a history of right knee osteoarthritis and does have a cane and a knee brace.    I have reviewed the Medications, Allergies, Past Medical and Surgical History, and Social History in the [employer] system.    Past Medical History:   Diagnosis Date     Alcohol dependence with withdrawal (H)     multiple detox attempts     Osteoarthritis of right knee      Seizures (H)     alcohol withdrawal     Substance abuse      Previous Medications    CALCIUM-VITAMIN D (CALTRATE) 600-400 MG-UNIT PER TABLET    Take 1 tablet by mouth 2 times daily    CETIRIZINE (ZYRTEC) 10 MG TABLET    Take 10 mg by mouth daily    CYANOCOBALAMIN PO    Take 1,000 mcg by mouth    FLUTICASONE (VERAMYST) 27.5 MCG/SPRAY SPRAY    Spray 1 spray into both nostrils daily    MULTIVITAMIN, THERAPEUTIC WITH MINERALS (THERA-VIT-M) TABS    Take 1 tablet by mouth daily    NALTREXONE HCL PO    Take 50 mg by mouth    VENLAFAXINE (EFFEXOR-ER) 75 MG TB24 24 HR TABLET    Take 1 tablet (75 mg) by mouth daily    VENLAFAXINE (EFFEXOR-XR) 75 MG 24 HR CAPSULE    Take 75 mg by mouth daily       Allergies   Allergen Reactions     Acetaminophen      Penicillins Other (See Comments)     Doesn't know     Percocet [Oxycodone-Acetaminophen]      Social History     Social History     Marital status: Single     Spouse name: N/A     Number of children: N/A     Years of education: N/A     Occupational History     Not on file.     Social History Main Topics     Smoking status: Current Every Day Smoker     Packs/day: 0.25     Smokeless  tobacco: Never Used     Alcohol use Yes      Comment: Daily 1-2 liters per day     Drug use: Yes     Special: Marijuana, Methamphetamines      Comment: occ     Sexual activity: Not on file     Other Topics Concern     Not on file     Social History Narrative     Past Surgical History:   Procedure Laterality Date     GALLBLADDER SURGERY       GYN SURGERY       No family history on file.    Review of Systems   Unable to perform ROS: Other   Secondary to acute intoxication      Physical Exam   BP: 116/76  Pulse: 98  Temp: 97.5  F (36.4  C)  Resp: 16  SpO2: 93 %      Physical Exam   Constitutional:   Grossly intoxicated but awake   HENT:   Head: Atraumatic.   Eyes: EOM are normal. Pupils are equal, round, and reactive to light.   Neck: Neck supple.   Pulmonary/Chest: No respiratory distress.   Musculoskeletal:   Brace removed from right knee and examined to find chronic changes without acute instability or point specific bony tenderness   Neurological:   Grossly symmetric   Psychiatric:   Intoxicated       ED Course     ED Course     Procedures            Results for orders placed or performed during the hospital encounter of 07/19/18   Alcohol breath test POCT   Result Value Ref Range    Alcohol Breath Test 0.245 (A) 0.00 - 0.01     X-ray evaluation of the patient's right knee reveals chronic changes without acute injury         Assessments & Plan (with Medical Decision Making)     I have reviewed the nursing notes.    At this time the patient is grossly intoxicated and has chronic disability with her right knee osteoarthritis.  Patient will be allowed to sleep here in the ER and sober up to the point where she can ambulate on her own and disposition will then be readdressed.      Final diagnoses:   Acute alcoholic intoxication in alcoholism without complication (H)   Chronic pain of right knee     Gelacio Rainey MD    7/19/2018   [facility], EMERGENCY DEPARTMENT     Gelacio Rainey MD  07/20/18  0008

## 2018-07-22 ENCOUNTER — HOSPITAL ENCOUNTER (EMERGENCY)
Facility: CLINIC | Age: 53
Discharge: HOME | End: 2018-07-22
Attending: EMERGENCY MEDICINE | Admitting: EMERGENCY MEDICINE
Payer: COMMERCIAL

## 2018-07-22 VITALS
TEMPERATURE: 95.9 F | DIASTOLIC BLOOD PRESSURE: 65 MMHG | RESPIRATION RATE: 20 BRPM | HEART RATE: 94 BPM | OXYGEN SATURATION: 94 % | SYSTOLIC BLOOD PRESSURE: 101 MMHG | HEIGHT: 65 IN

## 2018-07-22 DIAGNOSIS — F10.220 ACUTE ALCOHOLIC INTOXICATION IN ALCOHOLISM WITHOUT COMPLICATION (H): ICD-10-CM

## 2018-07-22 LAB
ALCOHOL BREATH TEST: 0.2 (ref 0–0.01)
ANION GAP SERPL CALCULATED.3IONS-SCNC: 9 MMOL/L (ref 3–14)
BASOPHILS # BLD AUTO: 0.1 10E9/L (ref 0–0.2)
BASOPHILS NFR BLD AUTO: 1 %
BUN SERPL-MCNC: 19 MG/DL (ref 7–30)
CALCIUM SERPL-MCNC: 7.7 MG/DL (ref 8.5–10.1)
CHLORIDE SERPL-SCNC: 112 MMOL/L (ref 94–109)
CO2 SERPL-SCNC: 28 MMOL/L (ref 20–32)
CREAT SERPL-MCNC: 0.58 MG/DL (ref 0.52–1.04)
DIFFERENTIAL METHOD BLD: ABNORMAL
EOSINOPHIL # BLD AUTO: 0.2 10E9/L (ref 0–0.7)
EOSINOPHIL NFR BLD AUTO: 3.3 %
ERYTHROCYTE [DISTWIDTH] IN BLOOD BY AUTOMATED COUNT: 16.3 % (ref 10–15)
GFR SERPL CREATININE-BSD FRML MDRD: >90 ML/MIN/1.7M2
GLUCOSE SERPL-MCNC: 127 MG/DL (ref 70–99)
HCT VFR BLD AUTO: 31.2 % (ref 35–47)
HGB BLD-MCNC: 10.2 G/DL (ref 11.7–15.7)
IMM GRANULOCYTES # BLD: 0 10E9/L (ref 0–0.4)
IMM GRANULOCYTES NFR BLD: 0.2 %
LYMPHOCYTES # BLD AUTO: 2 10E9/L (ref 0.8–5.3)
LYMPHOCYTES NFR BLD AUTO: 39.6 %
MCH RBC QN AUTO: 29.4 PG (ref 26.5–33)
MCHC RBC AUTO-ENTMCNC: 32.7 G/DL (ref 31.5–36.5)
MCV RBC AUTO: 90 FL (ref 78–100)
MONOCYTES # BLD AUTO: 0.6 10E9/L (ref 0–1.3)
MONOCYTES NFR BLD AUTO: 11.4 %
NEUTROPHILS # BLD AUTO: 2.3 10E9/L (ref 1.6–8.3)
NEUTROPHILS NFR BLD AUTO: 44.5 %
NRBC # BLD AUTO: 0 10*3/UL
NRBC BLD AUTO-RTO: 0 /100
PLATELET # BLD AUTO: 156 10E9/L (ref 150–450)
POTASSIUM SERPL-SCNC: 3.6 MMOL/L (ref 3.4–5.3)
RBC # BLD AUTO: 3.47 10E12/L (ref 3.8–5.2)
SODIUM SERPL-SCNC: 149 MMOL/L (ref 133–144)
WBC # BLD AUTO: 5.1 10E9/L (ref 4–11)

## 2018-07-22 PROCEDURE — 96361 HYDRATE IV INFUSION ADD-ON: CPT | Performed by: EMERGENCY MEDICINE

## 2018-07-22 PROCEDURE — 82075 ASSAY OF BREATH ETHANOL: CPT | Performed by: EMERGENCY MEDICINE

## 2018-07-22 PROCEDURE — 99285 EMERGENCY DEPT VISIT HI MDM: CPT | Mod: 25 | Performed by: EMERGENCY MEDICINE

## 2018-07-22 PROCEDURE — 25000128 H RX IP 250 OP 636: Performed by: EMERGENCY MEDICINE

## 2018-07-22 PROCEDURE — 85025 COMPLETE CBC W/AUTO DIFF WBC: CPT | Performed by: EMERGENCY MEDICINE

## 2018-07-22 PROCEDURE — 80048 BASIC METABOLIC PNL TOTAL CA: CPT | Performed by: EMERGENCY MEDICINE

## 2018-07-22 PROCEDURE — 96360 HYDRATION IV INFUSION INIT: CPT | Performed by: EMERGENCY MEDICINE

## 2018-07-22 PROCEDURE — 99284 EMERGENCY DEPT VISIT MOD MDM: CPT | Mod: Z6 | Performed by: EMERGENCY MEDICINE

## 2018-07-22 PROCEDURE — 25000132 ZZH RX MED GY IP 250 OP 250 PS 637: Performed by: EMERGENCY MEDICINE

## 2018-07-22 RX ORDER — MAGNESIUM OXIDE 400 MG/1
800 TABLET ORAL ONCE
Status: COMPLETED | OUTPATIENT
Start: 2018-07-22 | End: 2018-07-22

## 2018-07-22 RX ORDER — SODIUM CHLORIDE 9 MG/ML
1000 INJECTION, SOLUTION INTRAVENOUS CONTINUOUS
Status: DISCONTINUED | OUTPATIENT
Start: 2018-07-22 | End: 2018-07-22 | Stop reason: HOSPADM

## 2018-07-22 RX ORDER — FOLIC ACID 1 MG/1
1 TABLET ORAL ONCE
Status: COMPLETED | OUTPATIENT
Start: 2018-07-22 | End: 2018-07-22

## 2018-07-22 RX ORDER — LANOLIN ALCOHOL/MO/W.PET/CERES
100 CREAM (GRAM) TOPICAL ONCE
Status: COMPLETED | OUTPATIENT
Start: 2018-07-22 | End: 2018-07-22

## 2018-07-22 RX ORDER — MULTIVITAMIN,THERAPEUTIC
1 TABLET ORAL ONCE
Status: COMPLETED | OUTPATIENT
Start: 2018-07-22 | End: 2018-07-22

## 2018-07-22 RX ADMIN — SODIUM CHLORIDE 1000 ML: 9 INJECTION, SOLUTION INTRAVENOUS at 06:09

## 2018-07-22 RX ADMIN — MAGNESIUM OXIDE TAB 400 MG (241.3 MG ELEMENTAL MG) 800 MG: 400 (241.3 MG) TAB at 04:57

## 2018-07-22 RX ADMIN — SODIUM CHLORIDE 1000 ML: 9 INJECTION, SOLUTION INTRAVENOUS at 04:57

## 2018-07-22 RX ADMIN — THERA TABS 1 TABLET: TAB at 04:57

## 2018-07-22 RX ADMIN — FOLIC ACID 1 MG: 1 TABLET ORAL at 04:57

## 2018-07-22 RX ADMIN — Medication 100 MG: at 04:57

## 2018-07-22 ASSESSMENT — ENCOUNTER SYMPTOMS
CONSTITUTIONAL NEGATIVE: 1
NERVOUS/ANXIOUS: 1
RESPIRATORY NEGATIVE: 1
CARDIOVASCULAR NEGATIVE: 1
GASTROINTESTINAL NEGATIVE: 1

## 2018-07-22 NOTE — ED AVS SNAPSHOT
[facility], Emergency Department    [address]    [place] [address]    Phone:  [phone]    Fax:  [phone]                                       Kari Mackay   MRN: [id number]    Department:  [facility], Emergency Department   Date of Visit:  7/22/2018           Patient Information     Date Of Birth          1965        Your diagnoses for this visit were:     Acute alcoholic intoxication in alcoholism without complication (H)        You were seen by Angel Caal MD.        Discharge Instructions       There are no detox beds available at this time at [facility]  You may call the following numbers tomorrow to check on bed availability:    [facility]    [phone] [address] [facility]         [phone]  [facility]        [phone]    You need to get into a chemical dependency treatment program after you detox in order to stay sober    24 Hour Appointment Hotline       To make an appointment at any [facility] clinic, call [phone] ([phone]). If you don't have a family doctor or clinic, we will help you find one. [facility] clinics are conveniently located to serve the needs of you and your family.             Review of your medicines      Our records show that you are taking the medicines listed below. If these are incorrect, please call your family doctor or clinic.        Dose / Directions Last dose taken    calcium-vitamin D 600-400 MG-UNIT per tablet   Commonly known as:  CALTRATE   Dose:  1 tablet        Take 1 tablet by mouth 2 times daily   Refills:  0        cetirizine 10 MG tablet   Commonly known as:  zyrTEC   Dose:  10 mg        Take 10 mg by mouth daily   Refills:  0        CYANOCOBALAMIN PO   Dose:  1000 mcg        Take 1,000 mcg by mouth   Refills:  0        fluticasone 27.5 MCG/SPRAY spray   Commonly known as:  VERAMYST   Dose:  1 spray        Spray 1 spray into both nostrils daily   Refills:  0        ibuprofen  600 MG tablet   Commonly known as:  ADVIL/MOTRIN   Dose:  600 mg   Quantity:  40 tablet        Take 1 tablet (600 mg) by mouth every 6 hours as needed for moderate pain   Refills:  0        multivitamin, therapeutic with minerals Tabs tablet   Dose:  1 tablet   Quantity:  30 each        Take 1 tablet by mouth daily   Refills:  0        NALTREXONE HCL PO   Dose:  50 mg        Take 50 mg by mouth   Refills:  0        * venlafaxine 75 MG 24 hr capsule   Commonly known as:  EFFEXOR-XR   Dose:  75 mg        Take 75 mg by mouth daily   Refills:  0        * venlafaxine 75 MG Tb24 24 hr tablet   Commonly known as:  EFFEXOR-ER   Dose:  75 mg   Quantity:  3 tablet        Take 1 tablet (75 mg) by mouth daily   Refills:  3        * Notice:  This list has 2 medication(s) that are the same as other medications prescribed for you. Read the directions carefully, and ask your doctor or other care provider to review them with you.            Procedures and tests performed during your visit     Alcohol breath test POCT    Basic metabolic panel    CBC with platelets differential      Orders Needing Specimen Collection     Ordered          07/22/18 0358  Drug abuse screen 6 urine (chem dep) - STAT, [place]: STAT, Needs to be Collected     Scheduled Task Status   07/22/18 0359 Collect Drug abuse screen 6 urine (chem dep) Open   Order Class:  PCU Collect                  Pending Results     No orders found from 7/20/2018 to 7/23/2018.            Pending Culture Results     No orders found from 7/20/2018 to 7/23/2018.            Pending Results Instructions     If you had any lab results that were not finalized at the time of your Discharge, you can call the ED Lab Result RN at [phone]. You will be contacted by this team for any positive Lab results or changes in treatment. The nurses are available 7 days a week from 10A to 6:30P.  You can leave a message 24 hours per day and they will return your call.        Thank you for choosing  "[facility]       Thank you for choosing [facility] for your care. Our goal is always to provide you with excellent care. Hearing back from our patients is one way we can continue to improve our services. Please take a few minutes to complete the written survey that you may receive in the mail after you visit with us. Thank you!        [employer] Information     [employer]b lets you send messages to your doctor, view your test results, renew your prescriptions, schedule appointments and more. To sign up, go to [email or web address] . Click on \"Log in\" on the left side of the screen, which will take you to the Welcome page. Then click on \"Sign up Now\" on the right side of the page.     You will be asked to enter the access code listed below, as well as some personal information. Please follow the directions to create your username and password.     Your access code is: [email or web address]  Expires: 2018  6:24 AM     Your access code will  in 90 days. If you need help or a new code, please call your [facility] clinic or [phone].        Care EveryWhere ID     This is your Care EveryWhere ID. This could be used by other organizations to access your [facility] medical records  [id number]        Equal Access to Services     JAMAAL SKELTON : Abilio Miranda, wafilemonda rosario, qaybta kaalmada adedominique, sony valencia. So [facility] [phone].    ATENCIÓN: Si habla español, tiene a vazquez disposición servicios gratuitos de asistencia lingüística. Llame al [phone].    We comply with applicable federal civil rights laws and [place] laws. We do not discriminate on the basis of race, color, national origin, age, disability, sex, sexual orientation, or gender identity.            After Visit Summary       This is your record. Keep this with you and show to your community pharmacist(s) and doctor(s) at your next visit.                  "

## 2018-07-22 NOTE — ED AVS SNAPSHOT
[facility], [place], Emergency Department    [address]    [facility] [address]    Phone:  [phone]    Fax:  [phone]                                       Kari Mackay   MRN: [id number]    Department:  [facility], Emergency Department   Date of Visit:  7/22/2018           After Visit Summary Signature Page     I have received my discharge instructions, and my questions have been answered. I have discussed any challenges I see with this plan with the nurse or doctor.    ..........................................................................................................................................  Patient/Patient Representative Signature      ..........................................................................................................................................  Patient Representative Print Name and Relationship to Patient    ..................................................               ................................................  Date                                            Time    ..........................................................................................................................................  Reviewed by Signature/Title    ...................................................              ..............................................  Date                                                            Time

## 2018-07-22 NOTE — DISCHARGE INSTRUCTIONS
There are no detox beds available at this time at [facility]  You may call the following numbers tomorrow to check on bed availability:    [facility]    [phone] [address] [facility]         [phone]  [facility]        [phone]    You need to get into a chemical dependency treatment program after you detox in order to stay sober

## 2018-07-22 NOTE — ED PROVIDER NOTES
"  History     Chief Complaint   Patient presents with     Alcohol Intoxication     reported she only has 2 bottles today     Anxiety     called 911 cause she was having anxiety attack     HPI  Kari Mackay is a 53 year old female who has a history of chronic alcohol abuse and dependency.  This is her 12th emergency department visit this month for alcohol intoxication.  She says she felt like she was having a panic attack and therefore called 911.  Here she is calm and intoxicated no evidence for trauma no complaints of pain not suicidal.  She says she is interested in going to [place] detox.  I contacted them there is no bed available.    I have reviewed the Medications, Allergies, Past Medical and Surgical History, and Social History in the [employer] system.  Past Medical History:   Diagnosis Date     Alcohol dependence with withdrawal (H)     multiple detox attempts     Osteoarthritis of right knee      Seizures (H)     alcohol withdrawal     Substance abuse      Social History     Social History     Marital status: Single     Spouse name: N/A     Number of children: N/A     Years of education: N/A     Occupational History     Not on file.     Social History Main Topics     Smoking status: Current Every Day Smoker     Packs/day: 0.25     Smokeless tobacco: Never Used     Alcohol use Yes      Comment: daily     Drug use: Yes     Special: Marijuana      Comment: occ     Sexual activity: Not on file     Other Topics Concern     Not on file     Social History Narrative       Review of Systems   Constitutional: Negative.    Respiratory: Negative.    Cardiovascular: Negative.    Gastrointestinal: Negative.    Psychiatric/Behavioral: The patient is nervous/anxious.    All other systems reviewed and are negative.      Physical Exam   BP: 110/68  Pulse: 86  Temp: 96  F (35.6  C)  Resp: 20  Height: 165.1 cm (5' 5\")  SpO2: 96 %      Physical Exam   Constitutional: She is oriented to person, place, and time. She appears " well-developed and well-nourished.   Intoxicated   HENT:   Head: Normocephalic and atraumatic.   Mouth/Throat: Mucous membranes are dry.   Eyes: Conjunctivae are normal. Pupils are equal, round, and reactive to light.   Neck: Normal range of motion.   Cardiovascular: Normal rate, regular rhythm and normal heart sounds.    Pulmonary/Chest: Effort normal and breath sounds normal. No respiratory distress. She has no wheezes.   Abdominal: Soft.   Musculoskeletal: Normal range of motion.        Right lower leg: Normal.        Left lower leg: Normal.   Neurological: She is oriented to person, place, and time.   Intoxicated, slurred speech   Skin: Skin is warm and dry.   Psychiatric: She has a normal mood and affect. Her behavior is normal. Thought content normal. Her speech is slurred. She expresses impulsivity.   Nursing note and vitals reviewed.      ED Course     ED Course     Procedures        Medications   0.9% sodium chloride BOLUS (1,000 mLs Intravenous New Bag 7/22/18 0457)     Followed by   sodium chloride 0.9% infusion (not administered)   multivitamin, therapeutic (THERA-VIT) tablet 1 tablet (1 tablet Oral Given 7/22/18 0457)   folic acid (FOLVITE) tablet 1 mg (1 mg Oral Given 7/22/18 0457)   thiamine tablet 100 mg (100 mg Oral Given 7/22/18 0457)   magnesium oxide (MAG-OX) tablet 800 mg (800 mg Oral Given 7/22/18 0457)            Labs Ordered and Resulted from Time of ED Arrival Up to the Time of Departure from the ED   CBC WITH PLATELETS DIFFERENTIAL - Abnormal; Notable for the following:        Result Value    RBC Count 3.47 (*)     Hemoglobin 10.2 (*)     Hematocrit 31.2 (*)     RDW 16.3 (*)     All other components within normal limits   BASIC METABOLIC PANEL - Abnormal; Notable for the following:     Sodium 149 (*)     Chloride 112 (*)     Glucose 127 (*)     Calcium 7.7 (*)     All other components within normal limits   ALCOHOL BREATH TEST POCT - Abnormal; Notable for the following:     Alcohol Breath  Test 0.20 (*)     All other components within normal limits   DRUG ABUSE SCREEN 6 CHEM DEP URINE ([facility])            Assessments & Plan (with Medical Decision Making)   Acute alcohol intoxication and patient is chronically abusing alcohol.  She has multiple emergency department visits this month for the same.  She was picked up from her home by paramedics.  She was willing to go to [place] detox, I called and there are no beds available.  Once sober she will be discharged with phone numbers to try to get into detox she clearly needs to get into treatment.  We did check routine labs there is no significant abnormality.    I have reviewed the nursing notes.    I have reviewed the findings, diagnosis, plan and need for follow up with the patient.    New Prescriptions    No medications on file       Final diagnoses:   Acute alcoholic intoxication in alcoholism without complication (H)       7/22/2018   [facility], [place], EMERGENCY DEPARTMENT     Angel Caal MD  07/22/18 0572

## 2018-07-22 NOTE — ED NOTES
Bed: ED12  Expected date:   Expected time:   Means of arrival:   Comments:  Rama Pride [address] 54 y/o F ETOH and anxiety

## 2018-07-23 ENCOUNTER — HOSPITAL ENCOUNTER (EMERGENCY)
Facility: CLINIC | Age: 53
Discharge: HOME | End: 2018-07-24
Attending: EMERGENCY MEDICINE | Admitting: EMERGENCY MEDICINE
Payer: COMMERCIAL

## 2018-07-23 DIAGNOSIS — F10.220 ACUTE ALCOHOLIC INTOXICATION IN ALCOHOLISM WITHOUT COMPLICATION (H): ICD-10-CM

## 2018-07-23 LAB — ALCOHOL BREATH TEST: 0.23 (ref 0–0.01)

## 2018-07-23 PROCEDURE — 82075 ASSAY OF BREATH ETHANOL: CPT | Performed by: EMERGENCY MEDICINE

## 2018-07-23 PROCEDURE — 99282 EMERGENCY DEPT VISIT SF MDM: CPT | Mod: Z6 | Performed by: EMERGENCY MEDICINE

## 2018-07-23 PROCEDURE — 99285 EMERGENCY DEPT VISIT HI MDM: CPT | Performed by: EMERGENCY MEDICINE

## 2018-07-23 NOTE — ED NOTES
Bed: HW01  Expected date:   Expected time:   Means of arrival:   Comments:  Shannen 412    52 yo F  intoxicated

## 2018-07-23 NOTE — ED AVS SNAPSHOT
[facility], Emergency Department    [address]    [place] [address]    Phone:  [phone]    Fax:  [phone]                                       Kari Mackay   MRN: [id number]    Department:  [facility], Emergency Department   Date of Visit:  7/23/2018           Patient Information     Date Of Birth          1965        Your diagnoses for this visit were:     Acute alcoholic intoxication in alcoholism without complication (H)        You were seen by Niru Torrez MD.      Follow-up Information     Schedule an appointment as soon as possible for a visit with Jackie Kendrick MD.    Contact information:    [facility]  [address]  [facility] [address] [phone]          Discharge Instructions         You can call [facility] for detox bed availability.  [phone].     Alcohol Intoxication  Alcohol intoxication occurs when you drink alcohol faster than your liver can remove it from your system. The following facts are important to remember:    It can take 10 minutes or more to start to feel the effects of a drink, so you can easily get more intoxicated than you intended.    One drink may be more than 1 serving of alcohol. Depending on the drink, it can be 2 to 4 servings.    It takes about an hour for your body to metabolize (clear) 1 serving. If you have more than 1 drink, it can take a couple of hours or more.    Many things affect how drinks will affect you, including whether you ve eaten, how fast you drink, your size, how much you normally drink (or not), medicines you take, chronic diseases you have, and gender.  Signs and symptoms of alcohol poisoning  The following are signs and symptoms of alcohol poisoning:  Mild impairment    Reduced inhibitions    Slurred speech    Drowsiness    Decreased fine motor skills  Moderate impairment    Erratic behavior, aggression, depression    Impaired judgment    Confusion    Concentration  "difficulties    Coordination problems  Severe impairment    Vomiting    Seizures    Unconsciousness    Cold, clammy    Slow or irregular breathing    Hypothermia (low body temperature)    Coma  Health effects  Alcohol abuse causes health problems. Sometimes this can happen after only drinking a  little.\" There is no set number of drinks or amount of alcohol that defines too much. The more you drink at one time, and the more frequently you drink determine both the short-term and long-term health effects. It affects all parts of your body and your health, including your:    Brain. Alcohol is a central nervous system depressant. It can damage parts of the brain that affect your balance, memory, thinking, and emotions. It can cause memory loss, blackouts, depression, agitation, sleep cycle changes, and seizures. These changes may or may not be reversible.    Heart and vascular system. Alcohol affects multiple areas. It can damage heart muscle causing cardiomyopathy, which is a weakening and stretching of the heart muscle. This can lead to trouble breathing, an irregular heartbeat, atrial fibrillation, leg swelling, and heart failure. It makes the blood vessels stiffen causing hypertension (high blood pressure). All of these problems increase your risk of having heart attacks or strokes.    Liver. Alcohol causes fat to build up in the liver, affecting its normal function. This increases the risk for hepatitis, leading to abdominal pain, appetite loss, jaundice, bleeding problems, liver fibrosis, and cirrhosis. This in turn can affect your ability to fight off infections, and can cause diabetes. The liver changes prevent it from removing toxins in your blood that can cause encephalopathy. Signs of this are confusion, altered level of consciousness, personality changes, memory loss, seizures, coma, and death.    Pancreas. Alcohol can cause inflammation of the pancreas, or pancreatitis. This can cause pain in your abdomen, " fever, and diabetes.    Immune system. Alcohol weakens your immune system in a number of ways. It suppresses your immune system making it harder to fight off infections and colds. You will also have a higher risk of certain infections like pneumonia and tuberculosis.    Cancer risk. Alcohol raises your risk of cancer of the mouth, esophagus, pharynx, larynx, liver, and breast.    Sexual function. Alcohol abuse can also lead to sexual problems.  Alcohol use during pregnancy may cause permanent damage to the growing baby.  Home care  The following guidelines will help you care for yourself at home:    Don't drink any more alcohol.    Don't drive until all effects of the alcohol have worn off.    Don't operate machinery that can cause injuries.    Get lots of rest over the next few days. Drink plenty of water and other non-alcoholic liquids. Try to eat regular meals.    If you have been drinking heavily on a daily basis, you may go through alcohol withdrawal. The usual symptoms last 3 to 4 days and may include nervousness, shakiness, nausea, sweating, sleeplessness, and can even cause seizures and a serious withdrawal symptom called delirium tremens, or DTs. During this time, it is best that you stay with family or friends who can help and support you. You can also admit yourself to a residential detox program. If your symptoms are severe (seizures, severe shakiness, confusion), contact your doctor or call an ambulance for help (see below).   Follow-up care  If alcohol is a problem in your life, these are some organizations that can help you:    Alcoholics Anonymous offers support through a self-help fellowship. There are no dues or fees. See the Yellow Pages and call for time and place of meetings. Find AA online at www.aa.org.    [employer] offers support to families of alcohol users. Contact 520-932-3626, or online at www.al-anon.org.    National [place] on Alcoholism and Drug Dependence can be reached at 813-207-5154,  or online at www.ncadd.org.    There are also inpatient and residential alcohol detox programs. Check the Internet or phonebook Yellow Pages under  Drug Abuse and Treatment Centers.   Call 911  Call 911 if any of these occur:    Trouble breathing or slow irregular breathing    Chest pain    Sudden weakness on one side of your body or sudden trouble speaking    Heavy bleeding or vomiting blood    Very drowsy or trouble awakening    Fainting or loss of consciousness    Rapid heart rate    Seizure  When to seek medical advice  Call your healthcare provider right away if any of these occur:    Severe shakiness     Fever of 100.4 F (38 C) or higher, or as directed by your healthcare provider    Confusion or hallucinations (seeing, hearing, or feeling things that are not there)    Pain in your upper abdomen that gets worse    Repeated vomiting  Date Last Reviewed: 6/1/2016 2000-2017 [employer] [address] [place] [address]. All rights reserved. This information is not intended as a substitute for professional medical care. Always follow your healthcare professional's instructions.          24 Hour Appointment Hotline       To make an appointment at any [facility], call [phone] ([phone]). If you don't have a family doctor or clinic, we will help you find one. [place] clinics are conveniently located to serve the needs of you and your family.             Review of your medicines      Our records show that you are taking the medicines listed below. If these are incorrect, please call your family doctor or clinic.        Dose / Directions Last dose taken    calcium-vitamin D 600-400 MG-UNIT per tablet   Commonly known as:  CALTRATE   Dose:  1 tablet        Take 1 tablet by mouth 2 times daily   Refills:  0        cetirizine 10 MG tablet   Commonly known as:  zyrTEC   Dose:  10 mg        Take 10 mg by mouth daily   Refills:  0        CYANOCOBALAMIN PO   Dose:  1000 mcg         Take 1,000 mcg by mouth   Refills:  0        fluticasone 27.5 MCG/SPRAY spray   Commonly known as:  VERAMYST   Dose:  1 spray        Spray 1 spray into both nostrils daily   Refills:  0        ibuprofen 600 MG tablet   Commonly known as:  ADVIL/MOTRIN   Dose:  600 mg   Quantity:  40 tablet        Take 1 tablet (600 mg) by mouth every 6 hours as needed for moderate pain   Refills:  0        multivitamin, therapeutic with minerals Tabs tablet   Dose:  1 tablet   Quantity:  30 each        Take 1 tablet by mouth daily   Refills:  0        NALTREXONE HCL PO   Dose:  50 mg        Take 50 mg by mouth   Refills:  0        * venlafaxine 75 MG 24 hr capsule   Commonly known as:  EFFEXOR-XR   Dose:  75 mg        Take 75 mg by mouth daily   Refills:  0        * venlafaxine 75 MG Tb24 24 hr tablet   Commonly known as:  EFFEXOR-ER   Dose:  75 mg   Quantity:  3 tablet        Take 1 tablet (75 mg) by mouth daily   Refills:  3        * Notice:  This list has 2 medication(s) that are the same as other medications prescribed for you. Read the directions carefully, and ask your doctor or other care provider to review them with you.            Procedures and tests performed during your visit     Alcohol breath test POCT      Orders Needing Specimen Collection     None      Pending Results     No orders found for last 3 day(s).            Pending Culture Results     No orders found for last 3 day(s).            Pending Results Instructions     If you had any lab results that were not finalized at the time of your Discharge, you can call the ED Lab Result RN at [phone]. You will be contacted by this team for any positive Lab results or changes in treatment. The nurses are available 7 days a week from 10A to 6:30P.  You can leave a message 24 hours per day and they will return your call.        Thank you for choosing [facility]       Thank you for choosing [facility] for your care. Our goal is always to provide you with excellent care.  "Hearing back from our patients is one way we can continue to improve our services. Please take a few minutes to complete the written survey that you may receive in the mail after you visit with us. Thank you!        [employer] View Information     [employer] lets you send messages to your doctor, view your test results, renew your prescriptions, schedule appointments and more. To sign up, go to [email or web address] . Click on \"Log in\" on the left side of the screen, which will take you to the Welcome page. Then click on \"Sign up Now\" on the right side of the page.     You will be asked to enter the access code listed below, as well as some personal information. Please follow the directions to create your username and password.     Your access code is: [email or web address]  Expires: 2018  6:24 AM     Your access code will  in 90 days. If you need help or a new code, please call your [place] clinic or [phone].        Care EveryWhere ID     This is your Care EveryWhere ID. This could be used by other organizations to access your [place] medical records  [id number]        Equal Access to Services     [facility] : Hadblake Miranda, talat ponce, benito tavarez, sony barry . So [facility] [phone].    ATENCIÓN: Si habla español, tiene a vazquez disposición servicios gratuitos de asistencia lingüística. [place] al [phone].    We comply with applicable federal civil rights laws and [place] laws. We do not discriminate on the basis of race, color, national origin, age, disability, sex, sexual orientation, or gender identity.            After Visit Summary       This is your record. Keep this with you and show to your community pharmacist(s) and doctor(s) at your next visit.                  "

## 2018-07-23 NOTE — ED TRIAGE NOTES
"PT was downtown and had been drinking.  Pt states \"I wanna go to [facility], time to sober up.\"    "

## 2018-07-23 NOTE — ED AVS SNAPSHOT
[facility], [place], Emergency Department    [address]    [place] [address]    Phone:  [phone]    Fax:  [phone]                                       Kari Mackay   MRN: [id number]    Department:  [facility], Emergency Department   Date of Visit:  7/23/2018           After Visit Summary Signature Page     I have received my discharge instructions, and my questions have been answered. I have discussed any challenges I see with this plan with the nurse or doctor.    ..........................................................................................................................................  Patient/Patient Representative Signature      ..........................................................................................................................................  Patient Representative Print Name and Relationship to Patient    ..................................................               ................................................  Date                                            Time    ..........................................................................................................................................  Reviewed by Signature/Title    ...................................................              ..............................................  Date                                                            Time

## 2018-07-23 NOTE — DISCHARGE INSTRUCTIONS
"  You can call [facility] for detox bed availability.  [phone].     Alcohol Intoxication  Alcohol intoxication occurs when you drink alcohol faster than your liver can remove it from your system. The following facts are important to remember:    It can take 10 minutes or more to start to feel the effects of a drink, so you can easily get more intoxicated than you intended.    One drink may be more than 1 serving of alcohol. Depending on the drink, it can be 2 to 4 servings.    It takes about an hour for your body to metabolize (clear) 1 serving. If you have more than 1 drink, it can take a couple of hours or more.    Many things affect how drinks will affect you, including whether you ve eaten, how fast you drink, your size, how much you normally drink (or not), medicines you take, chronic diseases you have, and gender.  Signs and symptoms of alcohol poisoning  The following are signs and symptoms of alcohol poisoning:  Mild impairment    Reduced inhibitions    Slurred speech    Drowsiness    Decreased fine motor skills  Moderate impairment    Erratic behavior, aggression, depression    Impaired judgment    Confusion    Concentration difficulties    Coordination problems  Severe impairment    Vomiting    Seizures    Unconsciousness    Cold, clammy    Slow or irregular breathing    Hypothermia (low body temperature)    Coma  Health effects  Alcohol abuse causes health problems. Sometimes this can happen after only drinking a  little.\" There is no set number of drinks or amount of alcohol that defines too much. The more you drink at one time, and the more frequently you drink determine both the short-term and long-term health effects. It affects all parts of your body and your health, including your:    Brain. Alcohol is a central nervous system depressant. It can damage parts of the brain that affect your balance, memory, thinking, and emotions. It can cause memory loss, blackouts, depression, agitation, " sleep cycle changes, and seizures. These changes may or may not be reversible.    Heart and vascular system. Alcohol affects multiple areas. It can damage heart muscle causing cardiomyopathy, which is a weakening and stretching of the heart muscle. This can lead to trouble breathing, an irregular heartbeat, atrial fibrillation, leg swelling, and heart failure. It makes the blood vessels stiffen causing hypertension (high blood pressure). All of these problems increase your risk of having heart attacks or strokes.    Liver. Alcohol causes fat to build up in the liver, affecting its normal function. This increases the risk for hepatitis, leading to abdominal pain, appetite loss, jaundice, bleeding problems, liver fibrosis, and cirrhosis. This in turn can affect your ability to fight off infections, and can cause diabetes. The liver changes prevent it from removing toxins in your blood that can cause encephalopathy. Signs of this are confusion, altered level of consciousness, personality changes, memory loss, seizures, coma, and death.    Pancreas. Alcohol can cause inflammation of the pancreas, or pancreatitis. This can cause pain in your abdomen, fever, and diabetes.    Immune system. Alcohol weakens your immune system in a number of ways. It suppresses your immune system making it harder to fight off infections and colds. You will also have a higher risk of certain infections like pneumonia and tuberculosis.    Cancer risk. Alcohol raises your risk of cancer of the mouth, esophagus, pharynx, larynx, liver, and breast.    Sexual function. Alcohol abuse can also lead to sexual problems.  Alcohol use during pregnancy may cause permanent damage to the growing baby.  Home care  The following guidelines will help you care for yourself at home:    Don't drink any more alcohol.    Don't drive until all effects of the alcohol have worn off.    Don't operate machinery that can cause injuries.    Get lots of rest over the next  few days. Drink plenty of water and other non-alcoholic liquids. Try to eat regular meals.    If you have been drinking heavily on a daily basis, you may go through alcohol withdrawal. The usual symptoms last 3 to 4 days and may include nervousness, shakiness, nausea, sweating, sleeplessness, and can even cause seizures and a serious withdrawal symptom called delirium tremens, or DTs. During this time, it is best that you stay with family or friends who can help and support you. You can also admit yourself to a residential detox program. If your symptoms are severe (seizures, severe shakiness, confusion), contact your doctor or call an ambulance for help (see below).   Follow-up care  If alcohol is a problem in your life, these are some organizations that can help you:    Alcoholics Anonymous offers support through a self-help fellowship. There are no dues or fees. See the Yellow Pages and call for time and place of meetings. Find AA online at www.aa.org.    [employer] offers support to families of alcohol users. Contact 992-683-0628, or online at www.al-anolenny.org.    National [place] on Alcoholism and Drug Dependence can be reached at 146-804-6924, or online at www.ncadd.org.    There are also inpatient and residential alcohol detox programs. Check the Internet or phonebook Yellow Pages under  Drug Abuse and Treatment Centers.   Call 911  Call 911 if any of these occur:    Trouble breathing or slow irregular breathing    Chest pain    Sudden weakness on one side of your body or sudden trouble speaking    Heavy bleeding or vomiting blood    Very drowsy or trouble awakening    Fainting or loss of consciousness    Rapid heart rate    Seizure  When to seek medical advice  Call your healthcare provider right away if any of these occur:    Severe shakiness     Fever of 100.4 F (38 C) or higher, or as directed by your healthcare provider    Confusion or hallucinations (seeing, hearing, or feeling things that are not  there)    Pain in your upper abdomen that gets worse    Repeated vomiting  Date Last Reviewed: 6/1/2016 2000-2017 [employer]. [address] [place] [address], [place] [address]. All rights reserved. This information is not intended as a substitute for professional medical care. Always follow your healthcare professional's instructions.

## 2018-07-24 VITALS
DIASTOLIC BLOOD PRESSURE: 91 MMHG | OXYGEN SATURATION: 97 % | SYSTOLIC BLOOD PRESSURE: 139 MMHG | HEART RATE: 89 BPM | RESPIRATION RATE: 18 BRPM | TEMPERATURE: 97.4 F

## 2018-07-24 NOTE — ED PROVIDER NOTES
Patient seen by Dr. Torrez for acute alcohol intoxication.  Patient current resting.  Once sober able to DC home.       Rex Johnston MD  07/24/18 7637

## 2018-07-24 NOTE — ED NOTES
Awake and alert this morning. No evidence of severe withdrawal. Tolerating oral fluids and safely ambulating. Appears to be clinically sober.     Rodney Shay MD  07/24/18 6456

## 2018-12-04 NOTE — DISCHARGE INSTRUCTIONS
"  Alcohol Intoxication  Alcohol intoxication occurs when you drink alcohol faster than your liver can remove it from your system. The following facts are important to remember:    It can take 10 minutes or more to start to feel the effects of a drink, so you can easily get more intoxicated than you intended.    One drink may be more than 1 serving of alcohol. Depending on the drink, it can be 2 to 4 servings.    It takes about an hour for your body to metabolize (clear) 1 serving. If you have more than 1 drink, it can take a couple of hours or more.    Many things affect how drinks will affect you, including whether you ve eaten, how fast you drink, your size, how much you normally drink (or not), medicines you take, chronic diseases you have, and gender.  Signs and symptoms of alcohol poisoning  The following are signs and symptoms of alcohol poisoning:  Mild impairment    Reduced inhibitions    Slurred speech    Drowsiness    Decreased fine motor skills  Moderate impairment    Erratic behavior, aggression, depression    Impaired judgment    Confusion    Concentration difficulties    Coordination problems  Severe impairment    Vomiting    Seizures    Unconsciousness    Cold, clammy    Slow or irregular breathing    Hypothermia (low body temperature)    Coma  Health effects  Alcohol abuse causes health problems. Sometimes this can happen after only drinking a  little.\" There is no set number of drinks or amount of alcohol that defines too much. The more you drink at one time, and the more frequently you drink determine both the short-term and long-term health effects. It affects all parts of your body and your health, including your:    Brain. Alcohol is a central nervous system depressant. It can damage parts of the brain that affect your balance, memory, thinking, and emotions. It can cause memory loss, blackouts, depression, agitation, sleep cycle changes, and seizures. These changes may or may not be " Progress Notes by Yani Durán at 01/15/18 11:21 AM     Author:  Yani Durán Service:  (none) Author Type:  Medical Records Staff     Filed:  01/15/18 11:22 AM Encounter Date:  1/12/2018 Status:  Signed     :  Yani Durán (Medical Records Staff)            I AGREE[JN1.1M]      Revision History        User Key Date/Time User Provider Type Action    > JN1.1 01/15/18 11:22 AM Yani Durán Medical Records Staff Sign    M - Manual             reversible.    Heart and vascular system. Alcohol affects multiple areas. It can damage heart muscle causing cardiomyopathy, which is a weakening and stretching of the heart muscle. This can lead to trouble breathing, an irregular heartbeat, atrial fibrillation, leg swelling, and heart failure. It makes the blood vessels stiffen causing hypertension (high blood pressure). All of these problems increase your risk of having heart attacks or strokes.    Liver. Alcohol causes fat to build up in the liver, affecting its normal function. This increases the risk for hepatitis, leading to abdominal pain, appetite loss, jaundice, bleeding problems, liver fibrosis, and cirrhosis. This in turn can affect your ability to fight off infections, and can cause diabetes. The liver changes prevent it from removing toxins in your blood that can cause encephalopathy. Signs of this are confusion, altered level of consciousness, personality changes, memory loss, seizures, coma, and death.    Pancreas. Alcohol can cause inflammation of the pancreas, or pancreatitis. This can cause pain in your abdomen, fever, and diabetes.    Immune system. Alcohol weakens your immune system in a number of ways. It suppresses your immune system making it harder to fight off infections and colds. You will also have a higher risk of certain infections like pneumonia and tuberculosis.    Cancer risk. Alcohol raises your risk of cancer of the mouth, esophagus, pharynx, larynx, liver, and breast.    Sexual function. Alcohol abuse can also lead to sexual problems.  Alcohol use during pregnancy may cause permanent damage to the growing baby.  Home care  The following guidelines will help you care for yourself at home:    Don't drink any more alcohol.    Don't drive until all effects of the alcohol have worn off.    Don't operate machinery that can cause injuries.    Get lots of rest over the next few days. Drink plenty of water and other non-alcoholic liquids.  Try to eat regular meals.    If you have been drinking heavily on a daily basis, you may go through alcohol withdrawal. The usual symptoms last 3 to 4 days and may include nervousness, shakiness, nausea, sweating, sleeplessness, and can even cause seizures and a serious withdrawal symptom called delirium tremens, or DTs. During this time, it is best that you stay with family or friends who can help and support you. You can also admit yourself to a residential detox program. If your symptoms are severe (seizures, severe shakiness, confusion), contact your doctor or call an ambulance for help (see below).   Follow-up care  If alcohol is a problem in your life, these are some organizations that can help you:    Alcoholics Anonymous offers support through a self-help fellowship. There are no dues or fees. See the Yellow Pages and call for time and place of meetings. Find AA online at www.aa.org.    [employer]ace offers support to families of alcohol users. Contact 120-462-5179, or online at www.al-anolenny.org.    National [place] on Alcoholism and Drug Dependence can be reached at 051-174-9271, or online at www.ncadd.org.    There are also inpatient and residential alcohol detox programs. Check the Internet or phonebook Yellow Pages under  Drug Abuse and Treatment Centers.   Call 911  Call 911 if any of these occur:    Trouble breathing or slow irregular breathing    Chest pain    Sudden weakness on one side of your body or sudden trouble speaking    Heavy bleeding or vomiting blood    Very drowsy or trouble awakening    Fainting or loss of consciousness    Rapid heart rate    Seizure  When to seek medical advice  Call your healthcare provider right away if any of these occur:    Severe shakiness     Fever of 100.4 F (38 C) or higher, or as directed by your healthcare provider    Confusion or hallucinations (seeing, hearing, or feeling things that are not there)    Pain in your upper abdomen that gets worse    Repeated  vomiting  Date Last Reviewed: 6/1/2016 2000-2017 [employer]. [address] [place] [address], [place] [address]. All rights reserved. This information is not intended as a substitute for professional medical care. Always follow your healthcare professional's instructions.

## 2021-02-22 NOTE — ED NOTES
Report received from FELIZ Shukla   Submitted [place] for Linzess 290MCG capsules, Key: [id number]. Medication has been APPROVED through 02/22/2022. Please notify patient. Thank you.
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
## 2022-06-13 ENCOUNTER — TELEPHONE (OUTPATIENT)
Dept: BEHAVIORAL HEALTH | Facility: CLINIC | Age: 57
End: 2022-06-13

## 2022-06-13 ENCOUNTER — HOSPITAL ENCOUNTER (INPATIENT)
Facility: CLINIC | Age: 57
LOS: 2 days | Discharge: HOME | End: 2022-06-16
Attending: EMERGENCY MEDICINE | Admitting: PSYCHIATRY & NEUROLOGY
Payer: COMMERCIAL

## 2022-06-13 DIAGNOSIS — F10.220 ACUTE ALCOHOLIC INTOXICATION IN ALCOHOLISM WITHOUT COMPLICATION (H): ICD-10-CM

## 2022-06-13 DIAGNOSIS — E83.42 HYPOMAGNESEMIA: Primary | ICD-10-CM

## 2022-06-13 DIAGNOSIS — Z20.822 CONTACT WITH AND (SUSPECTED) EXPOSURE TO COVID-19: ICD-10-CM

## 2022-06-13 LAB
ALBUMIN SERPL-MCNC: 3.6 G/DL (ref 3.4–5)
ALCOHOL BREATH TEST: 0.34 (ref 0–0.01)
ALP SERPL-CCNC: 178 U/L (ref 40–150)
ALT SERPL W P-5'-P-CCNC: 22 U/L (ref 0–50)
AMPHETAMINES UR QL SCN: ABNORMAL
ANION GAP SERPL CALCULATED.3IONS-SCNC: 9 MMOL/L (ref 3–14)
AST SERPL W P-5'-P-CCNC: 75 U/L (ref 0–45)
BARBITURATES UR QL: ABNORMAL
BASOPHILS # BLD AUTO: 0 10E3/UL (ref 0–0.2)
BASOPHILS NFR BLD AUTO: 1 %
BENZODIAZ UR QL: ABNORMAL
BILIRUB SERPL-MCNC: 0.7 MG/DL (ref 0.2–1.3)
BUN SERPL-MCNC: 7 MG/DL (ref 7–30)
CALCIUM SERPL-MCNC: 9.3 MG/DL (ref 8.5–10.1)
CANNABINOIDS UR QL SCN: ABNORMAL
CHLORIDE BLD-SCNC: 101 MMOL/L (ref 94–109)
CO2 SERPL-SCNC: 30 MMOL/L (ref 20–32)
COCAINE UR QL: ABNORMAL
CREAT SERPL-MCNC: 0.57 MG/DL (ref 0.52–1.04)
EOSINOPHIL # BLD AUTO: 0 10E3/UL (ref 0–0.7)
EOSINOPHIL NFR BLD AUTO: 1 %
ERYTHROCYTE [DISTWIDTH] IN BLOOD BY AUTOMATED COUNT: 14.7 % (ref 10–15)
GFR SERPL CREATININE-BSD FRML MDRD: >90 ML/MIN/1.73M2
GLUCOSE BLD-MCNC: 125 MG/DL (ref 70–99)
HCT VFR BLD AUTO: 35 % (ref 35–47)
HGB BLD-MCNC: 12.1 G/DL (ref 11.7–15.7)
IMM GRANULOCYTES # BLD: 0.1 10E3/UL
IMM GRANULOCYTES NFR BLD: 1 %
LYMPHOCYTES # BLD AUTO: 0.6 10E3/UL (ref 0.8–5.3)
LYMPHOCYTES NFR BLD AUTO: 18 %
MCH RBC QN AUTO: 35.2 PG (ref 26.5–33)
MCHC RBC AUTO-ENTMCNC: 34.6 G/DL (ref 31.5–36.5)
MCV RBC AUTO: 102 FL (ref 78–100)
MONOCYTES # BLD AUTO: 0.2 10E3/UL (ref 0–1.3)
MONOCYTES NFR BLD AUTO: 6 %
NEUTROPHILS # BLD AUTO: 2.5 10E3/UL (ref 1.6–8.3)
NEUTROPHILS NFR BLD AUTO: 73 %
NRBC # BLD AUTO: 0 10E3/UL
NRBC BLD AUTO-RTO: 0 /100
OPIATES UR QL SCN: ABNORMAL
PLATELET # BLD AUTO: 73 10E3/UL (ref 150–450)
POTASSIUM BLD-SCNC: 3.1 MMOL/L (ref 3.4–5.3)
PROT SERPL-MCNC: 8.1 G/DL (ref 6.8–8.8)
RBC # BLD AUTO: 3.44 10E6/UL (ref 3.8–5.2)
SARS-COV-2 RNA RESP QL NAA+PROBE: NEGATIVE
SODIUM SERPL-SCNC: 140 MMOL/L (ref 133–144)
WBC # BLD AUTO: 3.5 10E3/UL (ref 4–11)

## 2022-06-13 PROCEDURE — 82075 ASSAY OF BREATH ETHANOL: CPT | Performed by: EMERGENCY MEDICINE

## 2022-06-13 PROCEDURE — 99284 EMERGENCY DEPT VISIT MOD MDM: CPT | Performed by: EMERGENCY MEDICINE

## 2022-06-13 PROCEDURE — 99285 EMERGENCY DEPT VISIT HI MDM: CPT | Performed by: EMERGENCY MEDICINE

## 2022-06-13 PROCEDURE — 80053 COMPREHEN METABOLIC PANEL: CPT | Performed by: EMERGENCY MEDICINE

## 2022-06-13 PROCEDURE — C9803 HOPD COVID-19 SPEC COLLECT: HCPCS | Performed by: EMERGENCY MEDICINE

## 2022-06-13 PROCEDURE — 80307 DRUG TEST PRSMV CHEM ANLYZR: CPT | Performed by: EMERGENCY MEDICINE

## 2022-06-13 PROCEDURE — 87635 SARS-COV-2 COVID-19 AMP PRB: CPT | Performed by: EMERGENCY MEDICINE

## 2022-06-13 PROCEDURE — 36415 COLL VENOUS BLD VENIPUNCTURE: CPT | Performed by: EMERGENCY MEDICINE

## 2022-06-13 PROCEDURE — 250N000013 HC RX MED GY IP 250 OP 250 PS 637: Performed by: EMERGENCY MEDICINE

## 2022-06-13 PROCEDURE — 85025 COMPLETE CBC W/AUTO DIFF WBC: CPT | Performed by: EMERGENCY MEDICINE

## 2022-06-13 PROCEDURE — 82040 ASSAY OF SERUM ALBUMIN: CPT | Performed by: EMERGENCY MEDICINE

## 2022-06-13 RX ORDER — MAGNESIUM OXIDE 400 MG/1
800 TABLET ORAL ONCE
Status: COMPLETED | OUTPATIENT
Start: 2022-06-13 | End: 2022-06-13

## 2022-06-13 RX ORDER — POTASSIUM CHLORIDE 750 MG/1
40 TABLET, EXTENDED RELEASE ORAL ONCE
Status: COMPLETED | OUTPATIENT
Start: 2022-06-13 | End: 2022-06-13

## 2022-06-13 RX ORDER — MULTIVITAMIN,THERAPEUTIC
1 TABLET ORAL ONCE
Status: COMPLETED | OUTPATIENT
Start: 2022-06-13 | End: 2022-06-13

## 2022-06-13 RX ORDER — FOLIC ACID 1 MG/1
1 TABLET ORAL ONCE
Status: COMPLETED | OUTPATIENT
Start: 2022-06-13 | End: 2022-06-13

## 2022-06-13 RX ORDER — DIAZEPAM 5 MG
5-20 TABLET ORAL EVERY 30 MIN PRN
Status: DISCONTINUED | OUTPATIENT
Start: 2022-06-13 | End: 2022-06-14

## 2022-06-13 RX ADMIN — POTASSIUM CHLORIDE 40 MEQ: 750 TABLET, EXTENDED RELEASE ORAL at 19:58

## 2022-06-13 RX ADMIN — THERA TABS 1 TABLET: TAB at 19:59

## 2022-06-13 RX ADMIN — FOLIC ACID 1 MG: 1 TABLET ORAL at 19:58

## 2022-06-13 RX ADMIN — THIAMINE HCL TAB 100 MG 100 MG: 100 TAB at 19:58

## 2022-06-13 RX ADMIN — Medication 800 MG: at 19:59

## 2022-06-13 ASSESSMENT — ACTIVITIES OF DAILY LIVING (ADL)
DIFFICULTY_COMMUNICATING: NO
WALKING_OR_CLIMBING_STAIRS_DIFFICULTY: NO
HEARING_DIFFICULTY_OR_DEAF: NO
CONCENTRATING,_REMEMBERING_OR_MAKING_DECISIONS_DIFFICULTY: NO
TOILETING_ISSUES: NO
FALL_HISTORY_WITHIN_LAST_SIX_MONTHS: NO
WEAR_GLASSES_OR_BLIND: YES
DIFFICULTY_EATING/SWALLOWING: NO
VISION_MANAGEMENT: GLASSES
DOING_ERRANDS_INDEPENDENTLY_DIFFICULTY: NO
EQUIPMENT_CURRENTLY_USED_AT_HOME: WALKER, STANDARD
DRESSING/BATHING_DIFFICULTY: NO

## 2022-06-13 ASSESSMENT — LIFESTYLE VARIABLES
SKIP TO QUESTIONS 9-10: 0
AUDIT-C TOTAL SCORE: 12

## 2022-06-13 NOTE — ED PROVIDER NOTES
[facility] EMERGENCY DEPARTMENT ([facility])       6/13/22  History     Chief Complaint   Patient presents with     Addiction Problem     HPI  Kari Mackay is a 56 year old female with a past medical history significant for polysubstance abuse (alcohol, cocaine, and cannabis), PTSD, hypertension, hypokalemia, and MDD who presents to the Emergency Department stating that she would like to get sober for few days.    Patient states that she normally drinks whiskey every day and goes through at least a liter a day.  She says that her last drink was an hour ago and states that she is here to get sober.  She has that she is never been sober very long before she goes back to drinking in the past.  Patient denies suicidal and homicidal ideation.  Patient denies vomiting, diarrhea, melena, bright red blood per rectum.  Patient denies chest pain, abdominal pain, fever, cough, shortness of breath.  Patient states that she does not have a history of alcohol withdrawal seizures or DTs.      Past Medical History:   Diagnosis Date     Alcohol dependence with withdrawal (H)     multiple detox attempts     Osteoarthritis of right knee      Seizures (H)     alcohol withdrawal     Substance abuse        Past Surgical History:   Procedure Laterality Date     GALLBLADDER SURGERY       GYN SURGERY         No family history on file.    Social History     Tobacco Use     Smoking status: Current Every Day Smoker     Packs/day: 0.25     Smokeless tobacco: Never Used   Substance Use Topics     Alcohol use: Yes     Comment: daily       Current Outpatient Medications   Medication     calcium-vitamin D (CALTRATE) 600-400 MG-UNIT per tablet     cetirizine (ZYRTEC) 10 MG tablet     CYANOCOBALAMIN PO     fluticasone (VERAMYST) 27.5 MCG/SPRAY spray     ibuprofen (ADVIL/MOTRIN) 600 MG tablet     multivitamin, therapeutic with minerals (THERA-VIT-M) TABS     NALTREXONE HCL PO     venlafaxine (EFFEXOR-ER) 75 MG TB24 24 hr tablet      "venlafaxine (EFFEXOR-XR) 75 MG 24 hr capsule        Allergies   Allergen Reactions     Acetaminophen      Penicillins Other (See Comments)     Doesn't know     Percocet [Oxycodone-Acetaminophen]         I have reviewed the Medications, Allergies, Past Medical and Surgical History, and Social History in the Epic system.    Review of Systems  A complete review of systems was performed with pertinent positives and negatives noted in the HPI, and all other systems negative.    Physical Exam   BP: 133/86  Pulse: 111  Temp: 97.8  F (36.6  C)  Resp: 20  Height: 165.1 cm (5' 5\")  Weight: 84.8 kg (187 lb)  SpO2: 93 %      Physical Exam  Vitals and nursing note reviewed.   Constitutional:       Comments: Grossly intoxicated alert pleasant   HENT:      Head: Atraumatic.   Eyes:      Pupils: Pupils are equal, round, and reactive to light.   Cardiovascular:      Rate and Rhythm: Regular rhythm.   Pulmonary:      Breath sounds: Normal breath sounds.   Musculoskeletal:         General: No deformity.      Cervical back: Neck supple.   Neurological:      General: No focal deficit present.      Mental Status: She is oriented to person, place, and time.   Psychiatric:         Mood and Affect: Mood normal.         ED Course     At 3:45 PM the patient was seen and examined by Gelacio Rainey MD in Room EDHW03.        Procedures         Orders Placed This Encounter   Procedures     Comprehensive metabolic panel     Asymptomatic COVID-19 Virus (Coronavirus) by PCR     CBC with platelets and differential     MSSA Score, Vital Signs, and Pain Assessment on admission and per protocol     Notify Physician - Alcohol Withdrawal MSSA focused     Alcohol breath test POCT     CBC with platelets differential     Urine Drugs of Abuse Screen         Results for orders placed or performed during the hospital encounter of 06/13/22 (from the past 24 hour(s))   Alcohol breath test POCT   Result Value Ref Range    Alcohol Breath Test 0.34 (A) " 0.00 - 0.01   CBC with platelets differential    Narrative    The following orders were created for panel order CBC with platelets differential.  Procedure                               Abnormality         Status                     ---------                               -----------         ------                     CBC with platelets and d...[[id number]]                      In process                   Please view results for these tests on the individual orders.     Medications   multivitamin, therapeutic (THERA-VIT) tablet 1 tablet (has no administration in time range)   folic acid (FOLVITE) tablet 1 mg (has no administration in time range)   thiamine (B-1) tablet 100 mg (has no administration in time range)   magnesium oxide (MAG-OX) tablet 800 mg (has no administration in time range)   diazepam (VALIUM) tablet 5-20 mg (has no administration in time range)             Assessments & Plan (with Medical Decision Making)     I have reviewed the nursing notes.    With COVID pending the case was discussed with our detox unit and a bed was reserved for the patient pending her pertinent testing is all negative.    Admit 3A    Final diagnoses:   Acute alcoholic intoxication in alcoholism without complication (H)       IPatt am serving as a trained medical scribe to document services personally performed by Gelacio Rainey MD, based on the provider's statements to me.      IGelacio MD, was physically present and have reviewed and verified the accuracy of this note documented by Patt Raymundo.     Gelacio Rainey MD  6/13/2022   [facility] EMERGENCY DEPARTMENT     Gelacio Rainey MD  06/13/22 8310

## 2022-06-13 NOTE — ED TRIAGE NOTES
Patient requesting detox from alcohol. Reports drinking whiskey daily. Last drink within the past hour. Denies other drug use. No suicidal ideation or homicidal ideation.     Triage Assessment     Row Name 06/13/22 6030       Triage Assessment (Adult)    Airway WDL WDL       Respiratory WDL    Respiratory WDL WDL       Skin Circulation/Temperature WDL    Skin Circulation/Temperature WDL WDL       Cardiac WDL    Cardiac WDL X;rhythm    Cardiac Rhythm tachycardic       Peripheral/Neurovascular WDL    Peripheral Neurovascular WDL WDL       Cognitive/Neuro/Behavioral WDL    Cognitive/Neuro/Behavioral WDL WDL

## 2022-06-13 NOTE — TELEPHONE ENCOUNTER
S: 4:54pm- ED MD called to request detox inpt for 56 yrs old female in the [facility] ED.     B: Pt presents to the ED seeking detox from alcohol.  She reports drinking a bottle of whiskey daily for a long, long time.  She reports that every time she goes into detox, she stays sober for a couple days and goes back into drinking.  Pt is on MSSA protocol.  She has a hx of w/d seizures and hasn't had one for many years.  ED MD did not see in her charge that she has a hx of w/d seizure.  She denies hx of DTs.  Pt is very intoxicated so she is not a good historian.    No mh concerns.  Ambulates independently.  Pt has no chronic medical illness.  She is on antidepressant.      Pt will be medically cleared, pending labs.     COVID: in pricess  UTOX: needs to be collected.  CBC: WNL  CMP: Potassium 3.1, replaced.  AST 75, Glucose 125  Breathalyzer: 0.34 @ 3:15pm.  VITALS: stable    A: Vol.  Calm and cooperative.  She wants to get sober.     R: Patient cleared and ready for behavioral bed placement: Yes     6:53pm- Dr Gresham accepts for 3A/[facility].  CD Admit.

## 2022-06-14 PROBLEM — F32.4 MAJOR DEPRESSION IN PARTIAL REMISSION (H): Status: ACTIVE | Noted: 2022-06-14

## 2022-06-14 PROBLEM — F10.220 ACUTE ALCOHOLIC INTOXICATION IN ALCOHOLISM WITHOUT COMPLICATION (H): Status: ACTIVE | Noted: 2022-06-14

## 2022-06-14 LAB
HOLD SPECIMEN: NORMAL
MAGNESIUM SERPL-MCNC: 0.6 MG/DL (ref 1.6–2.3)
POTASSIUM BLD-SCNC: 3 MMOL/L (ref 3.4–5.3)

## 2022-06-14 PROCEDURE — 250N000013 HC RX MED GY IP 250 OP 250 PS 637: Performed by: PHYSICIAN ASSISTANT

## 2022-06-14 PROCEDURE — 250N000013 HC RX MED GY IP 250 OP 250 PS 637: Performed by: PSYCHIATRY & NEUROLOGY

## 2022-06-14 PROCEDURE — 36415 COLL VENOUS BLD VENIPUNCTURE: CPT | Performed by: PHYSICIAN ASSISTANT

## 2022-06-14 PROCEDURE — HZ2ZZZZ DETOXIFICATION SERVICES FOR SUBSTANCE ABUSE TREATMENT: ICD-10-PCS | Performed by: PSYCHIATRY & NEUROLOGY

## 2022-06-14 PROCEDURE — 99221 1ST HOSP IP/OBS SF/LOW 40: CPT | Mod: AI | Performed by: PSYCHIATRY & NEUROLOGY

## 2022-06-14 PROCEDURE — 250N000011 HC RX IP 250 OP 636: Performed by: PSYCHIATRY & NEUROLOGY

## 2022-06-14 PROCEDURE — 128N000004 HC R&B CD ADULT

## 2022-06-14 PROCEDURE — 99231 SBSQ HOSP IP/OBS SF/LOW 25: CPT | Performed by: PHYSICIAN ASSISTANT

## 2022-06-14 PROCEDURE — 83735 ASSAY OF MAGNESIUM: CPT | Performed by: PHYSICIAN ASSISTANT

## 2022-06-14 PROCEDURE — 84132 ASSAY OF SERUM POTASSIUM: CPT | Performed by: PHYSICIAN ASSISTANT

## 2022-06-14 RX ORDER — FLUTICASONE PROPIONATE 50 MCG
1 SPRAY, SUSPENSION (ML) NASAL DAILY PRN
COMMUNITY

## 2022-06-14 RX ORDER — POTASSIUM CHLORIDE 1500 MG/1
40 TABLET, EXTENDED RELEASE ORAL ONCE
Status: COMPLETED | OUTPATIENT
Start: 2022-06-14 | End: 2022-06-14

## 2022-06-14 RX ORDER — AMLODIPINE BESYLATE 2.5 MG/1
2.5 TABLET ORAL DAILY
COMMUNITY

## 2022-06-14 RX ORDER — LANOLIN ALCOHOL/MO/W.PET/CERES
100 CREAM (GRAM) TOPICAL DAILY
COMMUNITY

## 2022-06-14 RX ORDER — POTASSIUM CHLORIDE 750 MG/1
20 TABLET, EXTENDED RELEASE ORAL 2 TIMES DAILY
COMMUNITY

## 2022-06-14 RX ORDER — GABAPENTIN 600 MG/1
TABLET ORAL
COMMUNITY

## 2022-06-14 RX ORDER — ATENOLOL 50 MG/1
50 TABLET ORAL DAILY PRN
Status: DISCONTINUED | OUTPATIENT
Start: 2022-06-14 | End: 2022-06-16 | Stop reason: HOSPADM

## 2022-06-14 RX ORDER — MAGNESIUM OXIDE 400 MG/1
800 TABLET ORAL 2 TIMES DAILY
Status: ON HOLD | COMMUNITY
End: 2022-06-16

## 2022-06-14 RX ORDER — MAGNESIUM OXIDE 400 MG/1
800 TABLET ORAL 3 TIMES DAILY
Status: DISCONTINUED | OUTPATIENT
Start: 2022-06-14 | End: 2022-06-15

## 2022-06-14 RX ORDER — VITAMIN B COMPLEX
25 TABLET ORAL DAILY
COMMUNITY

## 2022-06-14 RX ORDER — LANOLIN ALCOHOL/MO/W.PET/CERES
1000 CREAM (GRAM) TOPICAL DAILY
Status: DISCONTINUED | OUTPATIENT
Start: 2022-06-14 | End: 2022-06-16 | Stop reason: HOSPADM

## 2022-06-14 RX ORDER — NYSTATIN 100000 [USP'U]/G
POWDER TOPICAL
Status: ON HOLD | COMMUNITY
End: 2022-06-14

## 2022-06-14 RX ORDER — NALTREXONE HYDROCHLORIDE 50 MG/1
50 TABLET, FILM COATED ORAL DAILY
Status: DISCONTINUED | OUTPATIENT
Start: 2022-06-14 | End: 2022-06-16 | Stop reason: HOSPADM

## 2022-06-14 RX ORDER — ONDANSETRON 4 MG/1
4 TABLET, ORALLY DISINTEGRATING ORAL EVERY 6 HOURS PRN
Status: DISCONTINUED | OUTPATIENT
Start: 2022-06-14 | End: 2022-06-16 | Stop reason: HOSPADM

## 2022-06-14 RX ORDER — LOPERAMIDE HCL 2 MG
2 CAPSULE ORAL 4 TIMES DAILY PRN
Status: DISCONTINUED | OUTPATIENT
Start: 2022-06-14 | End: 2022-06-16 | Stop reason: HOSPADM

## 2022-06-14 RX ORDER — LIDOCAINE 40 MG/G
CREAM TOPICAL
Status: DISCONTINUED | OUTPATIENT
Start: 2022-06-14 | End: 2022-06-15

## 2022-06-14 RX ORDER — VENLAFAXINE HYDROCHLORIDE 75 MG/1
75 TABLET, EXTENDED RELEASE ORAL DAILY
Status: DISCONTINUED | OUTPATIENT
Start: 2022-06-14 | End: 2022-06-14

## 2022-06-14 RX ORDER — CAPSAICIN 0.025 %
CREAM (GRAM) TOPICAL
COMMUNITY

## 2022-06-14 RX ORDER — DIAZEPAM 5 MG
5-20 TABLET ORAL EVERY 30 MIN PRN
Status: DISCONTINUED | OUTPATIENT
Start: 2022-06-14 | End: 2022-06-16 | Stop reason: HOSPADM

## 2022-06-14 RX ORDER — FOLIC ACID 1 MG/1
1 TABLET ORAL DAILY
Status: DISCONTINUED | OUTPATIENT
Start: 2022-06-14 | End: 2022-06-16 | Stop reason: HOSPADM

## 2022-06-14 RX ORDER — MULTIPLE VITAMINS W/ MINERALS TAB 9MG-400MCG
1 TAB ORAL DAILY
Status: DISCONTINUED | OUTPATIENT
Start: 2022-06-14 | End: 2022-06-14

## 2022-06-14 RX ORDER — LANOLIN ALCOHOL/MO/W.PET/CERES
3 CREAM (GRAM) TOPICAL AT BEDTIME
COMMUNITY

## 2022-06-14 RX ORDER — MULTIPLE VITAMINS W/ MINERALS TAB 9MG-400MCG
1 TAB ORAL DAILY
Status: DISCONTINUED | OUTPATIENT
Start: 2022-06-14 | End: 2022-06-16 | Stop reason: HOSPADM

## 2022-06-14 RX ORDER — VENLAFAXINE HYDROCHLORIDE 75 MG/1
75 CAPSULE, EXTENDED RELEASE ORAL DAILY
Status: DISCONTINUED | OUTPATIENT
Start: 2022-06-14 | End: 2022-06-15

## 2022-06-14 RX ORDER — MAGNESIUM SULFATE HEPTAHYDRATE 40 MG/ML
4 INJECTION, SOLUTION INTRAVENOUS ONCE
Status: DISCONTINUED | OUTPATIENT
Start: 2022-06-14 | End: 2022-06-14

## 2022-06-14 RX ORDER — MULTIVITAMIN WITH FOLIC ACID 400 MCG
1 TABLET ORAL DAILY
COMMUNITY

## 2022-06-14 RX ORDER — ACETAMINOPHEN 325 MG/1
650 TABLET ORAL EVERY 6 HOURS PRN
COMMUNITY

## 2022-06-14 RX ORDER — FERROUS SULFATE 325(65) MG
325 TABLET ORAL
COMMUNITY

## 2022-06-14 RX ORDER — POLYETHYLENE GLYCOL, PROPYLENE GLYCOL .4; .3 G/100ML; G/100ML
1 LIQUID OPHTHALMIC 4 TIMES DAILY PRN
COMMUNITY

## 2022-06-14 RX ADMIN — ONDANSETRON 4 MG: 4 TABLET, ORALLY DISINTEGRATING ORAL at 09:22

## 2022-06-14 RX ADMIN — CALCIUM CARBONATE 600 MG (1,500 MG)-VITAMIN D3 400 UNIT TABLET 1 TABLET: at 20:32

## 2022-06-14 RX ADMIN — DIAZEPAM 10 MG: 5 TABLET ORAL at 00:47

## 2022-06-14 RX ADMIN — ATENOLOL 50 MG: 50 TABLET ORAL at 16:32

## 2022-06-14 RX ADMIN — Medication 800 MG: at 13:55

## 2022-06-14 RX ADMIN — DIAZEPAM 5 MG: 5 TABLET ORAL at 12:32

## 2022-06-14 RX ADMIN — NALTREXONE HYDROCHLORIDE 50 MG: 50 TABLET, FILM COATED ORAL at 12:32

## 2022-06-14 RX ADMIN — DIAZEPAM 10 MG: 5 TABLET ORAL at 16:29

## 2022-06-14 RX ADMIN — DIAZEPAM 10 MG: 5 TABLET ORAL at 09:22

## 2022-06-14 RX ADMIN — VENLAFAXINE HYDROCHLORIDE 75 MG: 75 CAPSULE, EXTENDED RELEASE ORAL at 12:32

## 2022-06-14 RX ADMIN — Medication 800 MG: at 20:32

## 2022-06-14 RX ADMIN — POTASSIUM CHLORIDE 40 MEQ: 1500 TABLET, EXTENDED RELEASE ORAL at 12:38

## 2022-06-14 ASSESSMENT — ACTIVITIES OF DAILY LIVING (ADL)
ORAL_HYGIENE: INDEPENDENT
ORAL_HYGIENE: INDEPENDENT
LAUNDRY: WITH SUPERVISION
DRESS: INDEPENDENT
HYGIENE/GROOMING: INDEPENDENT
HYGIENE/GROOMING: INDEPENDENT
DRESS: INDEPENDENT

## 2022-06-14 NOTE — PROGRESS NOTES
"Attempted to meet with pt to discuss aftercare plans. Pt was in bed and responded to voice, presents as defensive and not engaged with discharge planning. Pt asked why writer was asking questions regarding to discharge plan. Pt did state that she is here to sober up so she can go to her doctor in [place] and schedule knee replacement surgery. Pt states she does not need help with this and that she just \"walks in\" to the clinic and does her scheduling there. States treatment does not work for her and most recent program she attended was \"years ago\". Pt does report she lives in an apartment in [place]. Pt was asked if she lived alone and pt responded \"no, God, just go away, why do you keep talking?\" Pt declines further assistance. Pt has [employer] for insurance, pt is on restriction for medications however restriction information is not located in pt's chart.  AVS initiated.   "

## 2022-06-14 NOTE — CONSULTS
Internal Medicine Initial Visit      Kari Mackay MRN# [id number]   YOB: 1965 Age: 56 year old   Date of Admission: 6/13/2022  PCP: Jackie Kendrick    Referring Provider: Behavioral Health - Álvaro El MD  Reason for Visit: General Medical Evaluation     Assessment and Recommendations:   Kari Mackay is a 56 year old year old woman with a history of hypertension, depression, and polysubstance use disorder, who was admitted to station 3A seeking detoxification from alcohol.    Alcohol withdrawal   Alcohol use disorder  Drinking at least 1 liter per day. Last drink ~24 hours ago. She reports a history of withdrawal seizures. No prior DTs. MSSA 7 this shift.    - Continue on MSSA protocol with benzodiazepines as indicated   - Seizure precautions   - Management per Psychiatry   - Agree with MVI, folic acid, and thiamine supplements   - Notify medicine for SBP >180 or DBP >110    Hypokalemia: K 3.1 in ED, received 40 mEq replacement.   - Recheck potassium level today     Leukopenia: WBC 3.5, ANC 2.5. Most likely secondary to bone marrow suppression d/t alcohol use.   - Repeat CBC in 1-2 weeks with PCP     Thrombocytopenia: Platelets 73. No recent labs for comparison. No concerning bruising or e/o active bleeding. Hgb is wnl. Most likely secondary to alcohol use, possible she has an underlying component of hepatic dysfunction.    - No NSAIDs   - Repeat CBC in 1-2 weeks with PCP, or sooner if there are concerns for abnormal bleeding or bruising    Hypertension: Noted in chart, patient reports she is no longer taking medications. Currently normotensive.      Addendum: Magnesium level resulted at 0.6. Prefer to give 4g of IV magnesium sulfate to avoid adverse effects of high dose oral magnesium oxide replacement, however, unit is unable to administer IV magnesium per ANS. Discussed with Dr. Melissa Hernandez, patient does not meet criteria to transfer to medical floor. Will instead replace with  "magnesium oxide 800 mg TID and recheck mag level in the morning. Discussed plan with unit RN.    Medicine will follow results of repeat potassium level. Please do not hesitate to contact if new questions or concerns arise.     Iman Baca PA-C  [facility], [place]  Hospitalist Service  Pager: [phone]       Chief Complaint:   Alcohol withdrawal      History of Present Illness:     History is obtained from the patient and medical record.     Kari Mackay is a 56 year old year old woman with a history of hypertension, depression, and polysubstance use disorder, who was admitted to station 3A seeking detoxification from alcohol.  They have been drinking at least 1 liter per day. Last drink about 24 hours ago. Other substance use: occasional marijuana use. No recent IVDU. She reports a history of seizures that \"occur with drinking.\" No prior DTs.      Current withdrawal symptoms: tired, nauseated. No vomiting. Tolerating oral fluid intake. She has no acute medical concerns today.     No history of cardiovascular or pulmonary disease. Not diabetic. Denies any chest pain, palpitations, dyspnea, cough, cold symptoms, fever/chills, abdominal pain, urinary symptoms.            Review of Systems:   The 10 point Review of Systems is negative other than noted in the HPI or here.           Past Medical History:   Reviewed and updated in Epic.  Past Medical History:   Diagnosis Date     Alcohol dependence with withdrawal (H)     multiple detox attempts     Osteoarthritis of right knee      Seizures (H)     alcohol withdrawal     Substance abuse              Past Surgical History:   Reviewed and updated in Epic.  Past Surgical History:   Procedure Laterality Date     GALLBLADDER SURGERY       GYN SURGERY               Social History:     Social History     Tobacco Use     Smoking status: Current Every Day Smoker     Packs/day: 0.25     Smokeless tobacco: Never Used   Substance Use Topics     " Alcohol use: Yes     Comment: daily     Drug use: Yes     Types: Marijuana     Comment: occ             Family History:   Reviewed and updated in Epic.  No family history on file.          Allergies:     Allergies   Allergen Reactions     Acetaminophen      Penicillins Other (See Comments)     Doesn't know     Percocet [Oxycodone-Acetaminophen]              Medications:     Medications Prior to Admission   Medication Sig Dispense Refill Last Dose     calcium-vitamin D (CALTRATE) 600-400 MG-UNIT per tablet Take 1 tablet by mouth 2 times daily   Unknown at Unknown time     cetirizine (ZYRTEC) 10 MG tablet Take 10 mg by mouth daily   Unknown at Unknown time     CYANOCOBALAMIN PO Take 1,000 mcg by mouth   Unknown at Unknown time     fluticasone (VERAMYST) 27.5 MCG/SPRAY spray Spray 1 spray into both nostrils daily   Unknown at Unknown time     ibuprofen (ADVIL/MOTRIN) 600 MG tablet Take 1 tablet (600 mg) by mouth every 6 hours as needed for moderate pain 40 tablet 0 Unknown at Unknown time     multivitamin, therapeutic with minerals (THERA-VIT-M) TABS Take 1 tablet by mouth daily 30 each 0 Unknown at Unknown time     NALTREXONE HCL PO Take 50 mg by mouth   Unknown at Unknown time     venlafaxine (EFFEXOR-ER) 75 MG TB24 24 hr tablet Take 1 tablet (75 mg) by mouth daily 3 tablet 3 Unknown at Unknown time     venlafaxine (EFFEXOR-XR) 75 MG 24 hr capsule Take 75 mg by mouth daily   Unknown at Unknown time        Current Facility-Administered Medications   Medication     atenolol (TENORMIN) tablet 50 mg     diazepam (VALIUM) tablet 5-20 mg     folic acid (FOLVITE) tablet 1 mg     loperamide (IMODIUM) capsule 2 mg     multivitamin w/minerals (THERA-VIT-M) tablet 1 tablet     nicotine (NICORETTE) gum 2 mg     ondansetron (ZOFRAN ODT) ODT tab 4 mg     thiamine (B-1) tablet 100 mg            Physical Exam:   Blood pressure 133/78, pulse 102, temperature 97.9  F (36.6  C), temperature source Temporal, resp. rate 16, height 1.651  "m (5' 5\"), weight 84.8 kg (187 lb), SpO2 94 %, not currently breastfeeding.  Body mass index is 31.12 kg/m .  Constitutional: Awake and alert, in no apparent distress. Laying in bed. Appears tired.   Eyes: Sclera clear, anicteric   Respiratory: Breathing non-labored. CTAB. Good air entry.   Cardiovascular:  RRR, normal S1/S2. No rubs or murmurs. No peripheral edema.   GI: Soft, non-tender, non-distended. Normoactive bowel sounds.   Skin:  Good color. No jaundice. No visible rashes, lesions, or bruising of concern.   Neurologic: Alert and fully oriented. No focal deficits.             Data:   CBC:  Recent Labs   Lab Test 06/13/22  1639   WBC 3.5*   RBC 3.44*   HGB 12.1   HCT 35.0   *   MCH 35.2*   MCHC 34.6   RDW 14.7   PLT 73*       CMP:  Recent Labs   Lab Test 06/13/22  1639      POTASSIUM 3.1*   CHLORIDE 101   RYAN 9.3   CO2 30   BUN 7   CR 0.57   *   AST 75*   ALT 22   BILITOTAL 0.7   ALBUMIN 3.6   PROTTOTAL 8.1   ALKPHOS 178*       TSH:  TSH   Date Value Ref Range Status   07/01/2018 2.10 0.40 - 4.00 mU/L Final       Unresulted Labs Ordered in the Past 30 Days of this Admission     No orders found for last 31 day(s).                     "

## 2022-06-14 NOTE — PROGRESS NOTES
06/13/22 1270   Patient Belongings   Did you bring any home meds/supplements to the hospital?  No   Patient Belongings other (see comments)   Patient Belongings Remaining with Patient other (see comments)   Belongings Search Yes   Clothing Search Yes   Second Staff Jimena and Merlene   Storage bin: Shoes, Sweatshirt, 2 Hats, Hairbrush, Toiletries, Zip lock with cigarette, wallet, lighter, keys  Medical bin: Phone  Security: Env #[id number]: $30 [place], [place], [place] card  A             Admission:  I am responsible for any personal items that are not sent to the safe or pharmacy.  [facility] is not responsible for loss, theft or damage of any property in my possession.  Signature:  _________________________________ Date: _______  Time: _____                                              Staff Signature:  ____________________________ Date: ________  Time: _____      2nd Staff person, if patient is unable/unwilling to sign:    Signature: ________________________________ Date: ________  Time: _____   Discharge:  [facility] has returned all of my personal belongings:  Signature: _________________________________ Date: ________  Time: _____                                          Staff Signature:  ____________________________ Date: ________  Time: _____

## 2022-06-14 NOTE — PLAN OF CARE
Problem: Substance Misuse (Alcohol Withdrawal)  Goal: Readiness for Change Identified  Outcome: Ongoing, Progressing     Patient was still withdrawing from alcohol up to 4 pm but denied symptoms of withdrawal at 8 pm.    MSSA score this shift was 10 and and 5 valium 10 mg was given at 16:00and none at 8 pm.    Patient had critical lab values this morning and is large  oral dose of Mag 800 mg.    She ambulates with assist of a front wheel walker and is currently on seizure and falls precautions.    Pulse was tachy at 1600 and received prn Atenolol at 20:00 pulse was 87.    Patient denied anxiety , depression and other psych symptoms flor to be safe.Aury Dubois RN

## 2022-06-14 NOTE — PLAN OF CARE
Behavioral Team Discussion: (6/14/2022)    Continued Stay Criteria/Rationale: Patient admitted for Chemical Use Issues.  Plan: The following services will be provided to the patient; psychiatric assessment, medication management, therapeutic milieu, individual and group support, and skills groups.   Participants: 3A Provider: Dr. Dennis Dixon MD; 3A RN: Chasity Vega RN; 3A CM's: Laurel Skinner.  Summary/Recommendation: Providers will assess today for treatment recommendations, discharge planning, and aftercare plans. CM will meet with pt for discharge planning.   Medical/Physical: No biomedical concerns noted upon admission  Precautions:   Behavioral Orders   Procedures     Code 1 - Restrict to Unit     Fall precautions     Routine Programming     As clinically indicated     Seizure precautions     Status 15     Every 15 minutes.     Withdrawal precautions     Rationale for change in precautions or plan: N/A  Progress: Initial.

## 2022-06-14 NOTE — PLAN OF CARE
Goal Outcome Evaluation:    Pt continues to be monitored for ETOH withdrawal, MSSA scores this shift 10 (prn valium 10 mg given) and 8 (prn valium 5 mg given).  Pt nauseated, did not eat breakfast or lunch, except refused vitamins, medication complaint with all other medications, fluids encouraged. Pt reports anxiety , reports depression. Patient alert and oriented to person, place, and time, slept entire shift, out of room for assessments and medications. Pt is pleasant, blunted, good eye contact, cooperative. Pt on seizure, falls precautions with no activity/behaviors observed. Pt denies SI/HI/SIB, denies hallucinations at this time but states.Pt contracts for safety. Pt did not demonstrate delusional thinking, did not appear to be responding to internal stimuli. Pt engaged in partial group activities, observed unit milieu, appropriate with peers and staff. Pt communicates and verbalizes needs to staff. No behaviors noted. Total valium last 24 hours 25 mg, last dose 5 mg at 1232.    Critical value reported this morning 1122 - provider notified, received order for oral magnesium.    shelia: Behavioral Health Plan of Care  Goal: Plan of Care Review  Outcome: Ongoing, Progressing     Problem: Behavioral Health Plan of Care  Goal: Optimal Comfort and Wellbeing  Outcome: Ongoing, Progressing     Problem: Alcohol Withdrawal  Goal: Alcohol Withdrawal Symptom Control  Outcome: Ongoing, Progressing

## 2022-06-14 NOTE — DISCHARGE INSTRUCTIONS
Behavioral Discharge Planning and Instructions  THANK YOU FOR CHOOSING [facility]  3AW  [phone]    Summary: You were admitted to Station 3A on 6/14/22 for detoxification from alcohol.  A medical exam was performed that included lab work. You have met with a  and opted to decline assessment and referral to treatment.  Please take care and make your recovery a daily priority, Kari!  It was a pleasure working with you and the entire treatment team here wishes you the very best in your recovery!     Recommendation:  If you need more support to maintain sobriety call [phone] to schedule a chemical assessment and follow the recommendations of that assessment.      Main Diagnosis:  Per Dr. Santiago Kim MD;  303.90 (F10.20) Alcohol Use Disorder Severe    Major Treatments, Procedures and Findings:  You were treated for alcohol detoxification using valium administered based on the [facility] protocol. You declined a chemical dependency assessment. You had labs drawn and those results were reviewed with you. Please take a copy of your lab work with you to your next primary care provider appointment.    Symptoms to Report:  If you experience more anxiety, confusion, sleeplessness, deep sadness or thoughts of suicide, notify your treatment team or notify your primary care provider. IF ANY OF THE SYMPTOMS YOU ARE EXPERIENCING ARE A MEDICAL EMERGENCY CALL 911 IMMEDIATELY.     Lifestyle Adjustment: Adjust your lifestyle to get enough sleep, relaxation, exercise and good nutrition. Continue to develop healthy coping skills to decrease stress and promote a sober living environment. Do not use mood altering substances including alcohol, illegal drugs or addictive medications other than what is currently prescribed.     Disposition: home    Facts about COVID19 at www.cdc.gov/COVID19 and www.[place].gov/covid19    Keeping hands clean is one of the most important steps we can take to avoid getting sick and  spreading germs to others.  Please wash your hands frequently and lather with soap for at least 20 seconds!    Follow-up Appointment:   You declined to set up this follow up appointment prior to discharge. Please be seen within 2 weeks per internal medicine.  Per Internal Medicine: Repeat CBC in 1-2 weeks with PCP, or sooner if there are concerns for abnormal bleeding or bruising.  Lamar Durand, APRN, CNP    [address]    [place] [address]    Phone: [phone]    Fax: [phone]      Recovery apps for your phone to locate current in person and zoom recovery meetings  Pink Dubois - meeting giovanna  AA  - meeting giovanna  Meeting guide - meeting giovanna  Quick NA meeting - meeting giovanna  Juanita- has various apps    Resources:  *due to covid-19 most AA/NA meetings are being held online however some are in-person or a hybrid combination please check online to verify*  AA meetings search for them at: https://aa-intergroup.org (worldwide meeting listings)  AA meetings for [place] area can be found online at: https://a[place].org (click local online meetings listings)  NA meetings for [place] area can be found online at: https://www.nam[place].org  (click find a meeting)  AA and NA Sponsors are excellent resources for support and you can find one at any support group meeting.   Alcoholics Anonymous (https://aa.org/): for information 24 hours/day  AA Intergroup service office in [place] (http://www.aastpaul.org/) [phone]  AA Intergroup service office in [facility]: [phone]. (http://www.a[place].org/)  Narcotics Anonymous (www.nam[place].org) (366) 353-4735  https://aafairviewriverside.org/meetings  SMART Recovery - self management for addiction recovery:  www.smartrecovery.org  Pathways ~ A Health Crisis Resource & Support Center:  866.898.6436.  https://prescribetoprevent.org/patient-education/videos/  http://www.harmreduction.org  [facility] [phone]  Support  Group:  AA/NA and Sponsor/support.  National Emerson on Mental Illness (www.[place].eder.org): 187.848.8588 or 474-661-9455.  Alcoholics Anonymous (www.alcoholics-anonymous.org): Check your phone book for your local chapter.  Suicide Awareness Voices of Education (SAVE) (www.save.org): 540-292-DYYE (4890)  National Suicide Prevention Line (www.mentalhealthmn.org): 912-011-XFYM (0115)  Mental Health Consumer/Survivor Network of [place] (www.mhcsn.net): 712.324.9926 or 794-262-9728  Mental Health Association of [place] (www.mentalhealth.org): 996.999.2418 or 975-015-1762   Substance Abuse and Mental Health Services (www.samhsa.gov)  [place] Opioid Prevention Coalition: www.opioidcoalition.org    [place] Recovery Connection ([facility])  [facility] connects people seeking recovery to resources that help foster and sustain long-term recovery.  Whether you are seeking resources for treatment, transportation, housing, job training, education, health care or other pathways to recovery, [facility] is a great place to start.  703.961.1813.  www.[place]are[place]om[place].org    Great Pod casts for nutrition and wellness  Listen on Apple Podcasts  Dishing Up Nutrition   [employer]   Nutrition       Understand the connection between what you eat and how you feel. Hosted by licensed nutritionists and dietitians from [employer] we share practical, real-life solutions for healthier living through nutrition.     General Medication Instructions:   See your medication sheet(s) for instructions.   Take all medications as prescribed.  Make no changes unless your primary care provider suggests them.   Go to all your primary care provider visits.  Be sure to have all your required lab tests. This way, your medicines can be refilled on time.  Do not use any forms of alcohol.    Please Note:  If you have any questions at anytime after you are discharged please call [facility] detox unit 3AW at [phone].  [facility]  Behavioral Intake [phone]  Medical Records call [phone]  Outpatient Behavioral Intake call [phone]  LP+ Wait List/Bed Availability call [phone]    Please remember to take all of your behavioral discharge planning and lab paperwork to any follow up appointments, it contains your lab results, diagnosis, medication list and discharge recommendations.      THANK YOU FOR CHOOSING [facility]

## 2022-06-14 NOTE — PLAN OF CARE
Problem: Alcohol Withdrawal  Goal: Alcohol Withdrawal Symptom Control  Outcome: Ongoing, Progressing   Goal Outcome Evaluation:    Pt's MSSA was 9 & 7, received 10 mg of Valium x 1. Appeared a sleep for 6.25 hours during the 15 minutes safety checks.

## 2022-06-14 NOTE — PLAN OF CARE
"EDMAR          Kari Mackay is a 56-year-old female with a chief complaint of Addiction Problem.    S = Situation:     Voluntary admission to Station 3-A    B  = Background:     The patient presented to the ED seeking detox from alcohol. She reported drinking a bottle of whiskey daily for an extended period. She has a history of alcohol dependence and reports that every time she detoxes, she stays sober for a couple days and goes back into drinking. The patient has a history of withdrawal seizures but hasn't had one for many years. She has a history of depression, is currently on antidepressants, and denies a history of DTs. She has no chronic medical condition and ambulates with a walker. Her urine drug screen was positive for cannabinoids. She tested negative for COVID-19 and appears asymptomatic.       A  =  Assessment:     The patient appeared intoxicated and had some difficulty answering admission questions, but she denied SI and said that she just wanted to detox and return home. During the admission search, she refused to have her bra and underwear searched and give staff a necklace for safekeeping per unit policy, citing cultural reasons. Her MSSA score at admission was twelve.    Vital Signs: /67   Pulse 104   Temp 97.8  F (36.6  C) (Oral)   Resp 18   Ht 1.651 m (5' 5\")   Wt 84.8 kg (187 lb)   SpO2 92%   BMI 31.12 kg/m      R =   Request or Recommendation:     Q-15 safety checks, MSSA and withdrawal precautions, seizure precautions, fall precautions, individual and group therapy, and a therapeutic milieu.    "

## 2022-06-14 NOTE — H&P
Chief Complaint:     Patient was admitted for alcohol dependence        HPI:     Patient reports problems with alcohol dating back to age 19. Her longest sobriety was for 6 months when she was in a sober living. She has been in treatment numerous times.     Patient states that she has been drinking daily recently, and uses a quarter liter of hard liquor a day. Her last drink was yesterday.    Patient is treated for depression with Effexor. She feels that this is helpful. This is prescribed by her PMD. She has one admission as a teen for suicidality. As an adult she has never had a mental health admission and denies problems with suicidal thoughts or attempts    Patient states that she has never has had DTs but has had alcohol withdrawal hallucinations a couple of weeks ago that including visual hallucinations. She does not have current hallucinations        Past Psychiatric History:     Patient is treated for depression with Effexor. She feels that this is helpful. This is prescribed by her PMD. She has one admission as a teen for suicidality. As an adult she has never had a mental health admission and denies problems with suicidal thoughts or attempts          Substance Use and History:   Patient reports problems with alcohol dating back to age 19. Her longest sobriety was for 6 months when she was in a sober living. She has been in treatment numerous times.          Past Medical History:   PAST MEDICAL HISTORY:   Past Medical History:   Diagnosis Date     Alcohol dependence with withdrawal (H)     multiple detox attempts     Osteoarthritis of right knee      Seizures (H)     alcohol withdrawal     Substance abuse        PAST SURGICAL HISTORY:   Past Surgical History:   Procedure Laterality Date     GALLBLADDER SURGERY       GYN SURGERY               Family History:   FAMILY HISTORY: No family history on file.    Patient reports that alcohol problems run in her family but she can't specify      Social History:    Please see the full psychosocial profile from the clinical treatment coordinator.   SOCIAL HISTORY:   Social History     Tobacco Use     Smoking status: Current Every Day Smoker     Packs/day: 0.25     Smokeless tobacco: Never Used   Substance Use Topics     Alcohol use: Yes     Comment: daily     Patient has 4 children and she has regular contact with 2 of them.  She has a boyfriend of 3 years.   She lives with a roommate         PTA Medications:     Medications Prior to Admission   Medication Sig Dispense Refill Last Dose     calcium-vitamin D (CALTRATE) 600-400 MG-UNIT per tablet Take 1 tablet by mouth 2 times daily   Unknown at Unknown time     cetirizine (ZYRTEC) 10 MG tablet Take 10 mg by mouth daily   Unknown at Unknown time     CYANOCOBALAMIN PO Take 1,000 mcg by mouth   Unknown at Unknown time     fluticasone (VERAMYST) 27.5 MCG/SPRAY spray Spray 1 spray into both nostrils daily   Unknown at Unknown time     ibuprofen (ADVIL/MOTRIN) 600 MG tablet Take 1 tablet (600 mg) by mouth every 6 hours as needed for moderate pain 40 tablet 0 Unknown at Unknown time     multivitamin, therapeutic with minerals (THERA-VIT-M) TABS Take 1 tablet by mouth daily 30 each 0 Unknown at Unknown time     NALTREXONE HCL PO Take 50 mg by mouth   Unknown at Unknown time     venlafaxine (EFFEXOR-ER) 75 MG TB24 24 hr tablet Take 1 tablet (75 mg) by mouth daily 3 tablet 3 Unknown at Unknown time     venlafaxine (EFFEXOR-XR) 75 MG 24 hr capsule Take 75 mg by mouth daily   Unknown at Unknown time            Current Medications:       folic acid  1 mg Oral Daily     multivitamin w/minerals  1 tablet Oral Daily     thiamine  100 mg Oral Daily     atenolol, diazepam, loperamide, nicotine, ondansetron         Allergies:     Allergies   Allergen Reactions     Acetaminophen      Penicillins Other (See Comments)     Doesn't know     Percocet [Oxycodone-Acetaminophen]           Labs:     Recent Results (from the past 48 hour(s))   Alcohol  breath test POCT    Collection Time: 06/13/22  3:15 PM   Result Value Ref Range    Alcohol Breath Test 0.34 (A) 0.00 - 0.01   Comprehensive metabolic panel    Collection Time: 06/13/22  4:39 PM   Result Value Ref Range    Sodium 140 133 - 144 mmol/L    Potassium 3.1 (L) 3.4 - 5.3 mmol/L    Chloride 101 94 - 109 mmol/L    Carbon Dioxide (CO2) 30 20 - 32 mmol/L    Anion Gap 9 3 - 14 mmol/L    Urea Nitrogen 7 7 - 30 mg/dL    Creatinine 0.57 0.52 - 1.04 mg/dL    Calcium 9.3 8.5 - 10.1 mg/dL    Glucose 125 (H) 70 - 99 mg/dL    Alkaline Phosphatase 178 (H) 40 - 150 U/L    AST 75 (H) 0 - 45 U/L    ALT 22 0 - 50 U/L    Protein Total 8.1 6.8 - 8.8 g/dL    Albumin 3.6 3.4 - 5.0 g/dL    Bilirubin Total 0.7 0.2 - 1.3 mg/dL    GFR Estimate >90 >60 mL/min/1.73m2   CBC with platelets and differential    Collection Time: 06/13/22  4:39 PM   Result Value Ref Range    WBC Count 3.5 (L) 4.0 - 11.0 10e3/uL    RBC Count 3.44 (L) 3.80 - 5.20 10e6/uL    Hemoglobin 12.1 11.7 - 15.7 g/dL    Hematocrit 35.0 35.0 - 47.0 %     (H) 78 - 100 fL    MCH 35.2 (H) 26.5 - 33.0 pg    MCHC 34.6 31.5 - 36.5 g/dL    RDW 14.7 10.0 - 15.0 %    Platelet Count 73 (L) 150 - 450 10e3/uL    % Neutrophils 73 %    % Lymphocytes 18 %    % Monocytes 6 %    % Eosinophils 1 %    % Basophils 1 %    % Immature Granulocytes 1 %    NRBCs per 100 WBC 0 <1 /100    Absolute Neutrophils 2.5 1.6 - 8.3 10e3/uL    Absolute Lymphocytes 0.6 (L) 0.8 - 5.3 10e3/uL    Absolute Monocytes 0.2 0.0 - 1.3 10e3/uL    Absolute Eosinophils 0.0 0.0 - 0.7 10e3/uL    Absolute Basophils 0.0 0.0 - 0.2 10e3/uL    Absolute Immature Granulocytes 0.1 <=0.4 10e3/uL    Absolute NRBCs 0.0 10e3/uL   Asymptomatic COVID-19 Virus (Coronavirus) by PCR Nose    Collection Time: 06/13/22  4:41 PM    Specimen: Nose; Swab   Result Value Ref Range    SARS CoV2 PCR Negative Negative   Drug abuse screen 1 urine (ED)    Collection Time: 06/13/22  9:12 PM   Result Value Ref Range    Amphetamines Urine Screen  "Negative Screen Negative    Barbiturates Urine Screen Negative Screen Negative    Benzodiazepines Urine Screen Negative Screen Negative    Cannabinoids Urine Screen Positive (A) Screen Negative    Cocaine Urine Screen Negative Screen Negative    Opiates Urine Screen Negative Screen Negative          Physical Exam:     /84   Pulse 113   Temp 97.8  F (36.6  C) (Temporal)   Resp 16   Ht 1.651 m (5' 5\")   Wt 84.8 kg (187 lb)   SpO2 95%   BMI 31.12 kg/m    Weight is 187 lbs 0 oz  Body mass index is 31.12 kg/m .    Physical Exam:  Gen: No acute distress  HEENT: EOMI, no nystagmus or scleral icterus, moist mucous membranes  Skin: No diaphoresis or rash           Physical ROS:   The patient endorsed GI upset from withdrawal and knee/foot pain which are chronic for her. The remainder of 10-point review of systems was negative except as noted in HPI.             Mental Status Exam:     Mental Status  Patient is casually dressed  Hygiene good  Speech fluent  Thought Process concrete  Thought Content:  No suicidal ideation,    No homicidal ideation,   No ideas of reference,    No loose associations,    No auditory hallucinations,     No visual hallucinations   No delusions  Psychomotor: No agitation or slowing  Cognition:  Alert and oriented to time place and person  Attention good  Concentration fair  Memory normal including recent and remote memory  Mood:  Some dysphoria  Affect: mood congruent  Judgement: normal  Eye contact good  Cooperation good  Language normal  Fund of knowledge normal  Musculoskeletal normal gait with no abnormal movements         Admission Diagnoses:   Acute alcoholic intoxication in alcoholism without complication (H)    Patient Active Problem List    Diagnosis Date Noted     Acute alcoholic intoxication in alcoholism without complication (H) 06/14/2022     Priority: Medium     Major depression in partial remission (H) 06/14/2022     Priority: Medium     Hypernatremia 07/01/2018     " Priority: Medium     Alcohol dependence (H) 02/23/2015     Priority: Medium     Alcohol addiction (H) 08/15/2013     Priority: Medium              Assessment:     Patient presents with alcohol dependence. She is on Effexor for depression but may need to go up on dose gradually considering withdrawal symptoms especially GI upset         Plan:     Legal:voluntary    Medication:    calcium carbonate 600 mg-vitamin D 400 units  1 tablet Oral BID     cyanocobalamin  1,000 mcg Oral Daily     folic acid  1 mg Oral Daily     multivitamin w/minerals  1 tablet Oral Daily     naltrexone  50 mg Oral Daily     thiamine  100 mg Oral Daily     venlafaxine  75 mg Oral Daily       Consults: Hospitalist will be consulted if medical issues arise      Multidisciplinary Interventions:  to gather collateral information, coordinate care with outpatient providers and begin follow up planning      Disposition: assess options for CD treatment        More than 60 minutes spent on this visit with more than 50% time spent on coordination of care with staff, reviewing medical record, psychoeducation, providing supportive therapy regarding coping with chronic mental illness, entering orders and preparing documentation for the visit    Santiago Kim MD    Initial Certification I certify that the inpatient psychiatric facility admission was medically necessary for treatment which could reasonably be expected to improve the patient s condition.        I estimate 5 days of hospitalization is necessary for proper treatment of the patient. My plans for post-hospital care this patient are CD treatment     Santiago Kim MD     -     6/14/2022     -

## 2022-06-15 LAB
CHOLEST SERPL-MCNC: 241 MG/DL
FOLATE SERPL-MCNC: 30.1 NG/ML
GGT SERPL-CCNC: 1216 U/L (ref 0–40)
HDLC SERPL-MCNC: 104 MG/DL
LDLC SERPL CALC-MCNC: 112 MG/DL
MAGNESIUM SERPL-MCNC: 1 MG/DL (ref 1.6–2.3)
NONHDLC SERPL-MCNC: 137 MG/DL
POTASSIUM BLD-SCNC: 3 MMOL/L (ref 3.4–5.3)
TRIGL SERPL-MCNC: 124 MG/DL
TSH SERPL DL<=0.005 MIU/L-ACNC: 3.4 MU/L (ref 0.4–4)
VIT B12 SERPL-MCNC: 643 PG/ML (ref 193–986)

## 2022-06-15 PROCEDURE — 84132 ASSAY OF SERUM POTASSIUM: CPT | Performed by: PHYSICIAN ASSISTANT

## 2022-06-15 PROCEDURE — 82607 VITAMIN B-12: CPT | Performed by: PSYCHIATRY & NEUROLOGY

## 2022-06-15 PROCEDURE — 84443 ASSAY THYROID STIM HORMONE: CPT | Performed by: PSYCHIATRY & NEUROLOGY

## 2022-06-15 PROCEDURE — 82977 ASSAY OF GGT: CPT | Performed by: PSYCHIATRY & NEUROLOGY

## 2022-06-15 PROCEDURE — 99231 SBSQ HOSP IP/OBS SF/LOW 25: CPT | Performed by: PSYCHIATRY & NEUROLOGY

## 2022-06-15 PROCEDURE — 83735 ASSAY OF MAGNESIUM: CPT | Performed by: PHYSICIAN ASSISTANT

## 2022-06-15 PROCEDURE — 82746 ASSAY OF FOLIC ACID SERUM: CPT | Performed by: PSYCHIATRY & NEUROLOGY

## 2022-06-15 PROCEDURE — 128N000004 HC R&B CD ADULT

## 2022-06-15 PROCEDURE — 250N000013 HC RX MED GY IP 250 OP 250 PS 637: Performed by: PSYCHIATRY & NEUROLOGY

## 2022-06-15 PROCEDURE — 36415 COLL VENOUS BLD VENIPUNCTURE: CPT | Performed by: PSYCHIATRY & NEUROLOGY

## 2022-06-15 PROCEDURE — 250N000013 HC RX MED GY IP 250 OP 250 PS 637: Performed by: PHYSICIAN ASSISTANT

## 2022-06-15 PROCEDURE — 80061 LIPID PANEL: CPT | Performed by: PSYCHIATRY & NEUROLOGY

## 2022-06-15 RX ORDER — VITAMIN B COMPLEX
25 TABLET ORAL DAILY
Status: DISCONTINUED | OUTPATIENT
Start: 2022-06-15 | End: 2022-06-16 | Stop reason: HOSPADM

## 2022-06-15 RX ORDER — MAGNESIUM OXIDE 400 MG/1
800 TABLET ORAL 2 TIMES DAILY
Status: DISCONTINUED | OUTPATIENT
Start: 2022-06-15 | End: 2022-06-15

## 2022-06-15 RX ORDER — MAGNESIUM OXIDE 400 MG/1
800 TABLET ORAL 3 TIMES DAILY
Status: DISCONTINUED | OUTPATIENT
Start: 2022-06-15 | End: 2022-06-16 | Stop reason: HOSPADM

## 2022-06-15 RX ORDER — AMLODIPINE BESYLATE 2.5 MG/1
2.5 TABLET ORAL DAILY
Status: DISCONTINUED | OUTPATIENT
Start: 2022-06-15 | End: 2022-06-16 | Stop reason: HOSPADM

## 2022-06-15 RX ORDER — POTASSIUM CHLORIDE 1500 MG/1
20 TABLET, EXTENDED RELEASE ORAL 2 TIMES DAILY
Status: DISCONTINUED | OUTPATIENT
Start: 2022-06-15 | End: 2022-06-15

## 2022-06-15 RX ORDER — VENLAFAXINE HYDROCHLORIDE 150 MG/1
150 CAPSULE, EXTENDED RELEASE ORAL DAILY
Status: DISCONTINUED | OUTPATIENT
Start: 2022-06-15 | End: 2022-06-16 | Stop reason: HOSPADM

## 2022-06-15 RX ORDER — POTASSIUM CHLORIDE 1500 MG/1
40 TABLET, EXTENDED RELEASE ORAL ONCE
Status: COMPLETED | OUTPATIENT
Start: 2022-06-15 | End: 2022-06-15

## 2022-06-15 RX ORDER — FERROUS SULFATE 325(65) MG
325 TABLET ORAL
Status: DISCONTINUED | OUTPATIENT
Start: 2022-06-15 | End: 2022-06-16 | Stop reason: HOSPADM

## 2022-06-15 RX ADMIN — FERROUS SULFATE TAB 325 MG (65 MG ELEMENTAL FE) 325 MG: 325 (65 FE) TAB at 10:34

## 2022-06-15 RX ADMIN — ATENOLOL 50 MG: 50 TABLET ORAL at 17:05

## 2022-06-15 RX ADMIN — CALCIUM CARBONATE 600 MG (1,500 MG)-VITAMIN D3 400 UNIT TABLET 1 TABLET: at 09:13

## 2022-06-15 RX ADMIN — Medication 800 MG: at 21:10

## 2022-06-15 RX ADMIN — NALTREXONE HYDROCHLORIDE 50 MG: 50 TABLET, FILM COATED ORAL at 09:12

## 2022-06-15 RX ADMIN — FOLIC ACID 1 MG: 1 TABLET ORAL at 09:13

## 2022-06-15 RX ADMIN — POTASSIUM CHLORIDE 20 MEQ: 1500 TABLET, EXTENDED RELEASE ORAL at 10:33

## 2022-06-15 RX ADMIN — CYANOCOBALAMIN TAB 1000 MCG 1000 MCG: 1000 TAB at 09:12

## 2022-06-15 RX ADMIN — THIAMINE HCL TAB 100 MG 100 MG: 100 TAB at 09:12

## 2022-06-15 RX ADMIN — Medication 800 MG: at 09:13

## 2022-06-15 RX ADMIN — MULTIPLE VITAMINS W/ MINERALS TAB 1 TABLET: TAB at 09:12

## 2022-06-15 RX ADMIN — AMLODIPINE BESYLATE 2.5 MG: 2.5 TABLET ORAL at 10:34

## 2022-06-15 RX ADMIN — Medication 25 MCG: at 10:34

## 2022-06-15 RX ADMIN — VENLAFAXINE HYDROCHLORIDE 150 MG: 150 CAPSULE, EXTENDED RELEASE ORAL at 09:12

## 2022-06-15 RX ADMIN — Medication 800 MG: at 15:28

## 2022-06-15 RX ADMIN — CALCIUM CARBONATE 600 MG (1,500 MG)-VITAMIN D3 400 UNIT TABLET 1 TABLET: at 21:10

## 2022-06-15 RX ADMIN — POTASSIUM CHLORIDE 40 MEQ: 1500 TABLET, EXTENDED RELEASE ORAL at 15:28

## 2022-06-15 ASSESSMENT — ACTIVITIES OF DAILY LIVING (ADL)
DRESS: SCRUBS (BEHAVIORAL HEALTH)
LAUNDRY: WITH SUPERVISION
ORAL_HYGIENE: INDEPENDENT
DRESS: INDEPENDENT
LAUNDRY: WITH SUPERVISION
HYGIENE/GROOMING: INDEPENDENT
HYGIENE/GROOMING: INDEPENDENT
ORAL_HYGIENE: INDEPENDENT

## 2022-06-15 NOTE — PLAN OF CARE
"Goal Outcome Evaluation:    Plan of Care Reviewed With: patient     Overall Patient Progress: improving    Outcome Evaluation: Pt is in monitoring status for alcohol withdrawal but is not requiring medication to treat alcohol withdrawal.    Pt irritable this morning on initial assessment but then calmed herself down and was pleasant with interactions. She is not exhibiting any sweats, shakes or hallucinations. Vitals are wnl. Pt MSSA score today is a 3, no valium indicated. Total valium administered since arrival to [facility] = 35mg, last valium yesterday at 1629.    Pt asking about discharge and when she will be able to discharge. Explained to her that if her withdrawal symptoms continue to be low she will be out of monitoring status this later this afternoon. States would like to discharge tomorrow to home. Asked her plan after detox pt states to return home. Asked her how she'll stay sober states \"I'll figure that out\".     Denies anxiety and depression. Denies suicidal ideation plans or intent. Irritable in the morning but did become pleasant in interactions as the day progressed.    K+ replaced, Mag increased to TID scheduled dosing. K+ 3.0 and Mag 1.0 today.     Will continue to monitor and intervene as warranted.         "

## 2022-06-15 NOTE — PHARMACY-ADMISSION MEDICATION HISTORY
Admission Medication History Completed by Pharmacy     See [facility] Admission Navigator for allergy information, preferred outpatient pharmacy, prior to admission medications and immunization status.      Medication History Sources:     Fill History from [facility]     Changes made to PTA medication list (reason):    Added:  ? Daily-Luba Tabs - Take 1 tab PO every day   ? Amlodipine 2.5 mg Tabs - Take 1 tab PO every day   ? Thiamine 100 mg Tabs - Take 1 tab PO every day   ? Diclofenac 1% Gel - Apply 2 gm to skin on knee and shoulder four times daily  ? Potassium Chloride ER 10 mEq Tabs - Take 20 mEq PO BID  ? Magnesium Oxide 400 mg Tabs - Take 800 mg PO BID  ? Gabapentin 600 mg Tabs - Take 1 tab PO every day at midday and take 2 tabs PO at bedtime  ? Ferrous Sulfate - Take 1 tab PO with breakfast   ? Melatonin - Take 3 mg PO at bedtime  ? Capsaicin 0.025% Cream - Apply thin layer topically to knee and shoulder TID PRN  ? Acetaminophen 325 mg tabs - Take 650 mg PO Q6H PRN mild pain  ? Lubricating Eye Drops 0.4-0.3% Ophthalmic Solution - Place 1 drop into the left eye QID PRN dry eyes    Deleted:   ? Ibuprofen 600 mg tabs - Take 1 tab PO Q6H PRN moderate pain  ? Thera-Vit-M Tabs - Take 1 tab PO every day   ? Cyanocobalamin - Take 1000 units PO every day     Changed:  ? Venlafaxine 75 mg caps, take 75 mg PO every day -> Venlafaxine 150 mg caps, take 300 mg PO every day  ? Fluticasone 27.5 mcg/spray, spray 1 spray into both nostrils daily -> Fluticasone 50 mcg/spray, spray 1 spray into both nostrils daily PRN  ? Calcium-vitamin D 600-400 mg-unit tabs, take 1 tab PO BID -> Calcium-vitamin D 500-400 mg-unit tabs, take 2 tabs PO BID     Additional Information:    Pharmacist Neisha Page spoke with RN at  to obtain last dose information.    Prior to Admission medications    Medication Sig Last Dose Taking? Auth Provider Long Term End Date   acetaminophen (TYLENOL) 325 MG tablet Take 650 mg by mouth every 6 hours as  needed for mild pain  at PRN Yes Unknown, Entered By History     amLODIPine (NORVASC) 2.5 MG tablet Take 2.5 mg by mouth daily 6/13/2022 Yes Unknown, Entered By History Yes    Calcium Carb-Cholecalciferol (CALCIUM-VITAMIN D) 500-400 MG-UNIT TABS Take 2 tablets by mouth 2 times daily 6/13/2022 Yes Unknown, Entered By History     capsaicin (ZOSTRIX) 0.025 % external cream Apply thin layer topically to knee and shoulder three times a day as needed  at PRN Yes Unknown, Entered By History     cetirizine (ZYRTEC) 10 MG tablet Take 10 mg by mouth daily 6/13/2022 Yes Reported, Patient     ferrous sulfate (FEROSUL) 325 (65 Fe) MG tablet Take 325 mg by mouth daily (with breakfast) 6/13/2022 Yes Unknown, Entered By History     fluticasone (FLONASE) 50 MCG/ACT nasal spray Spray 1 spray into both nostrils daily as needed for allergies  at PRN Yes Unknown, Entered By History     gabapentin (NEURONTIN) 600 MG tablet Take 1 tablet by mouth daily at midday and take 2 tablets by mouth at bedtime 6/13/2022 Yes Unknown, Entered By History Yes    magnesium oxide (MAG-OX) 400 MG tablet Take 800 mg by mouth 2 times daily 6/13/2022 Yes Unknown, Entered By History     melatonin 3 MG tablet Take 3 mg by mouth At Bedtime 6/13/2022 Yes Unknown, Entered By History     Multiple Vitamin (DAILY-TITA) TABS Take 1 tablet by mouth daily 6/13/2022 Yes Unknown, Entered By History     naltrexone (DEPADE/REVIA) 50 MG tablet Take 50 mg by mouth daily 6/13/2022 Yes Reported, Patient Yes    polyethylene glycol-propylene glycol (LUBRICATING EYE DROPS) 0.4-0.3 % SOLN ophthalmic solution Place 1 drop Into the left eye 4 times daily as needed for dry eyes 6/13/2022 Yes Unknown, Entered By History     potassium chloride ER (KLOR-CON M) 10 MEQ CR tablet Take 20 mEq by mouth 2 times daily 6/13/2022 Yes Unknown, Entered By History     thiamine (B-1) 100 MG tablet Take 100 mg by mouth daily 6/13/2022 Yes Unknown, Entered By History     venlafaxine (EFFEXOR XR) 150  MG 24 hr capsule Take 300 mg by mouth daily 6/13/2022 Yes Reported, Patient Yes    Vitamin D3 (CHOLECALCIFEROL) 25 mcg (1000 units) tablet Take 25 mcg by mouth daily 6/13/2022 Yes Unknown, Entered By History        Date completed: 06/14/22     Medication history completed by: Niurka Power

## 2022-06-15 NOTE — PROGRESS NOTES
Brief Medicine Note    Medicine following for hypomagnesemia and hypokalemia. Mag 1.0 this morning (up from 0.6) and potassium 3.0.     Plan:   - Magnesium oxide 800 mg TID   - KCl 40 mEq x1   - Recheck K, mag in AM      Iman Baca PA-C  Hospitalist Service  Pager: [phone]         18

## 2022-06-15 NOTE — PROGRESS NOTES
Chief Complaint:     Patient was admitted for alcohol dependence      Subjective/Objective:     Patient is tolerating detox. She has been given Valium for meeting withdrawal criteria. She complains of knee pain but denies other physical complaints today. She denies depression presently and has no suicidal or homicidal thoughts.           HPI:     Patient reports problems with alcohol dating back to age 19. Her longest sobriety was for 6 months when she was in a sober living. She has been in treatment numerous times.     Patient states that she has been drinking daily recently, and uses a quarter liter of hard liquor a day. Her last drink was yesterday.    Patient is treated for depression with Effexor. She feels that this is helpful. This is prescribed by her PMD. She has one admission as a teen for suicidality. As an adult she has never had a mental health admission and denies problems with suicidal thoughts or attempts    Patient states that she has never has had DTs but has had alcohol withdrawal hallucinations a couple of weeks ago that including visual hallucinations. She does not have current hallucinations        Past Psychiatric History:     Patient is treated for depression with Effexor. She feels that this is helpful. This is prescribed by her PMD. She has one admission as a teen for suicidality. As an adult she has never had a mental health admission and denies problems with suicidal thoughts or attempts          Substance Use and History:   Patient reports problems with alcohol dating back to age 19. Her longest sobriety was for 6 months when she was in a sober living. She has been in treatment numerous times.          Past Medical History:   PAST MEDICAL HISTORY:   Past Medical History:   Diagnosis Date     Alcohol dependence with withdrawal (H)     multiple detox attempts     Osteoarthritis of right knee      Seizures (H)     alcohol withdrawal     Substance abuse        PAST SURGICAL HISTORY:    Past Surgical History:   Procedure Laterality Date     GALLBLADDER SURGERY       GYN SURGERY               Family History:   FAMILY HISTORY: No family history on file.    Patient reports that alcohol problems run in her family but she can't specify      Social History:   Please see the full psychosocial profile from the clinical treatment coordinator.   SOCIAL HISTORY:   Social History     Tobacco Use     Smoking status: Current Every Day Smoker     Packs/day: 0.25     Smokeless tobacco: Never Used   Substance Use Topics     Alcohol use: Yes     Comment: daily     Patient has 4 children and she has regular contact with 2 of them.  She has a boyfriend of 3 years.   She lives with a roommate         PTA Medications:     Medications Prior to Admission   Medication Sig Dispense Refill Last Dose     acetaminophen (TYLENOL) 325 MG tablet Take 650 mg by mouth every 6 hours as needed for mild pain    at PRN     amLODIPine (NORVASC) 2.5 MG tablet Take 2.5 mg by mouth daily   6/13/2022     Calcium Carb-Cholecalciferol (CALCIUM-VITAMIN D) 500-400 MG-UNIT TABS Take 2 tablets by mouth 2 times daily   6/13/2022     capsaicin (ZOSTRIX) 0.025 % external cream Apply thin layer topically to knee and shoulder three times a day as needed    at PRN     cetirizine (ZYRTEC) 10 MG tablet Take 10 mg by mouth daily   6/13/2022     ferrous sulfate (FEROSUL) 325 (65 Fe) MG tablet Take 325 mg by mouth daily (with breakfast)   6/13/2022     fluticasone (FLONASE) 50 MCG/ACT nasal spray Spray 1 spray into both nostrils daily as needed for allergies    at PRN     gabapentin (NEURONTIN) 600 MG tablet Take 1 tablet by mouth daily at midday and take 2 tablets by mouth at bedtime   6/13/2022     magnesium oxide (MAG-OX) 400 MG tablet Take 800 mg by mouth 2 times daily   6/13/2022     melatonin 3 MG tablet Take 3 mg by mouth At Bedtime   6/13/2022     Multiple Vitamin (DAILY-TITA) TABS Take 1 tablet by mouth daily   6/13/2022     naltrexone  (DEPADE/REVIA) 50 MG tablet Take 50 mg by mouth daily   6/13/2022     polyethylene glycol-propylene glycol (LUBRICATING EYE DROPS) 0.4-0.3 % SOLN ophthalmic solution Place 1 drop Into the left eye 4 times daily as needed for dry eyes   6/13/2022     potassium chloride ER (KLOR-CON M) 10 MEQ CR tablet Take 20 mEq by mouth 2 times daily   6/13/2022     thiamine (B-1) 100 MG tablet Take 100 mg by mouth daily   6/13/2022     venlafaxine (EFFEXOR XR) 150 MG 24 hr capsule Take 300 mg by mouth daily   6/13/2022     Vitamin D3 (CHOLECALCIFEROL) 25 mcg (1000 units) tablet Take 25 mcg by mouth daily   6/13/2022            Current Medications:       calcium carbonate 600 mg-vitamin D 400 units  1 tablet Oral BID     cyanocobalamin  1,000 mcg Oral Daily     folic acid  1 mg Oral Daily     magnesium oxide  800 mg Oral TID     multivitamin w/minerals  1 tablet Oral Daily     naltrexone  50 mg Oral Daily     sodium chloride (PF)  3 mL Intracatheter Q8H     thiamine  100 mg Oral Daily     venlafaxine  75 mg Oral Daily     atenolol, diazepam, lidocaine 4%, lidocaine (buffered or not buffered), loperamide, nicotine, ondansetron, sodium chloride (PF)         Allergies:     Allergies   Allergen Reactions     Acetaminophen      Penicillins Other (See Comments)     Doesn't know     Percocet [Oxycodone-Acetaminophen]           Labs:     Recent Results (from the past 48 hour(s))   Alcohol breath test POCT    Collection Time: 06/13/22  3:15 PM   Result Value Ref Range    Alcohol Breath Test 0.34 (A) 0.00 - 0.01   Comprehensive metabolic panel    Collection Time: 06/13/22  4:39 PM   Result Value Ref Range    Sodium 140 133 - 144 mmol/L    Potassium 3.1 (L) 3.4 - 5.3 mmol/L    Chloride 101 94 - 109 mmol/L    Carbon Dioxide (CO2) 30 20 - 32 mmol/L    Anion Gap 9 3 - 14 mmol/L    Urea Nitrogen 7 7 - 30 mg/dL    Creatinine 0.57 0.52 - 1.04 mg/dL    Calcium 9.3 8.5 - 10.1 mg/dL    Glucose 125 (H) 70 - 99 mg/dL    Alkaline Phosphatase 178 (H) 40 -  150 U/L    AST 75 (H) 0 - 45 U/L    ALT 22 0 - 50 U/L    Protein Total 8.1 6.8 - 8.8 g/dL    Albumin 3.6 3.4 - 5.0 g/dL    Bilirubin Total 0.7 0.2 - 1.3 mg/dL    GFR Estimate >90 >60 mL/min/1.73m2   CBC with platelets and differential    Collection Time: 06/13/22  4:39 PM   Result Value Ref Range    WBC Count 3.5 (L) 4.0 - 11.0 10e3/uL    RBC Count 3.44 (L) 3.80 - 5.20 10e6/uL    Hemoglobin 12.1 11.7 - 15.7 g/dL    Hematocrit 35.0 35.0 - 47.0 %     (H) 78 - 100 fL    MCH 35.2 (H) 26.5 - 33.0 pg    MCHC 34.6 31.5 - 36.5 g/dL    RDW 14.7 10.0 - 15.0 %    Platelet Count 73 (L) 150 - 450 10e3/uL    % Neutrophils 73 %    % Lymphocytes 18 %    % Monocytes 6 %    % Eosinophils 1 %    % Basophils 1 %    % Immature Granulocytes 1 %    NRBCs per 100 WBC 0 <1 /100    Absolute Neutrophils 2.5 1.6 - 8.3 10e3/uL    Absolute Lymphocytes 0.6 (L) 0.8 - 5.3 10e3/uL    Absolute Monocytes 0.2 0.0 - 1.3 10e3/uL    Absolute Eosinophils 0.0 0.0 - 0.7 10e3/uL    Absolute Basophils 0.0 0.0 - 0.2 10e3/uL    Absolute Immature Granulocytes 0.1 <=0.4 10e3/uL    Absolute NRBCs 0.0 10e3/uL   Asymptomatic COVID-19 Virus (Coronavirus) by PCR Nose    Collection Time: 06/13/22  4:41 PM    Specimen: Nose; Swab   Result Value Ref Range    SARS CoV2 PCR Negative Negative   Drug abuse screen 1 urine (ED)    Collection Time: 06/13/22  9:12 PM   Result Value Ref Range    Amphetamines Urine Screen Negative Screen Negative    Barbiturates Urine Screen Negative Screen Negative    Benzodiazepines Urine Screen Negative Screen Negative    Cannabinoids Urine Screen Positive (A) Screen Negative    Cocaine Urine Screen Negative Screen Negative    Opiates Urine Screen Negative Screen Negative   Potassium    Collection Time: 06/14/22 10:22 AM   Result Value Ref Range    Potassium 3.0 (L) 3.4 - 5.3 mmol/L   Magnesium    Collection Time: 06/14/22 10:22 AM   Result Value Ref Range    Magnesium 0.6 (LL) 1.6 - 2.3 mg/dL   Extra Purple Top Tube    Collection Time:  "06/14/22 10:22 AM   Result Value Ref Range    Hold Specimen JIC           Physical Exam:     /70   Pulse 74   Temp 97.5  F (36.4  C) (Temporal)   Resp 18   Ht 1.651 m (5' 5\")   Wt 84.8 kg (187 lb)   SpO2 97%   BMI 31.12 kg/m    Weight is 187 lbs 0 oz  Body mass index is 31.12 kg/m .    Physical Exam:  Gen: No acute distress  HEENT: EOMI, no nystagmus or scleral icterus, moist mucous membranes  Skin: No diaphoresis or rash           Physical ROS:   The patient endorsed GI upset from withdrawal and knee/foot pain which are chronic for her.     Review of Systems is otherwise negative including HEENT, CV, Respiratory, GI, , Musculoskeletal, Neurologic, Dermatologic, Endocrine, Immunological, Constitutional systems               Mental Status Exam:     Mental Status  Patient is casually dressed  Hygiene good  Speech fluent  Thought Process concrete  Thought Content:  No suicidal ideation,    No homicidal ideation,   No ideas of reference,    No loose associations,    No auditory hallucinations,     No visual hallucinations   No delusions  Psychomotor: No agitation or slowing  Cognition:  Alert and oriented to time place and person  Attention good  Concentration fair  Memory normal including recent and remote memory  Mood:  Some dysphoria  Affect: mood congruent  Judgement: normal  Eye contact good  Cooperation good  Language normal  Fund of knowledge normal  Musculoskeletal normal gait with no abnormal movements  Minimal change in mental status in the past 24 hours         Admission Diagnoses:   Acute alcoholic intoxication in alcoholism without complication (H)    Patient Active Problem List    Diagnosis Date Noted     Acute alcoholic intoxication in alcoholism without complication (H) 06/14/2022     Priority: Medium     Major depression in partial remission (H) 06/14/2022     Priority: Medium     Hypernatremia 07/01/2018     Priority: Medium     Alcohol dependence (H) 02/23/2015     Priority: Medium     " Alcohol addiction (H) 08/15/2013     Priority: Medium              Assessment:     Patient presents with alcohol dependence. She is on Effexor for depression but may need to go up on dose gradually considering withdrawal symptoms especially GI upset         Plan:     Legal:voluntary    Medication:    calcium carbonate 600 mg-vitamin D 400 units  1 tablet Oral BID     cyanocobalamin  1,000 mcg Oral Daily     folic acid  1 mg Oral Daily     magnesium oxide  800 mg Oral TID     multivitamin w/minerals  1 tablet Oral Daily     naltrexone  50 mg Oral Daily     sodium chloride (PF)  3 mL Intracatheter Q8H     thiamine  100 mg Oral Daily     venlafaxine  75 mg Oral Daily     Will anticipate increasing Effexor back to 300 mg a day over the  Next few days    Consults: Hospitalist will be consulted if medical issues arise      Multidisciplinary Interventions:  to gather collateral information, coordinate care with outpatient providers and begin follow up planning      Disposition: assess options for CD treatment      Patient seen, chart reviewed, case reviewed with  and with nursing.   Case reviewed in multi-disciplinary treatment team.      Santiago Kim MD    Re-Certification   I certify that the inpatient psychiatric facility services furnished since the previous certification were, and continue to be, medically necessary for, either, treatment which could reasonably be expected to improve the patient s condition or diagnostic study and that the hospital records indicate that the services furnished were, either, intensive treatment services, admission and related services necessary for diagnostic study, or equivalent services.    I certify that the patient continues to need, on a daily basis, active treatment furnished directly by or requiring the supervision of inpatient psychiatric facility personnel.  I estimate 4 days of hospitalization is necessary for proper treatment of the  patient. My plans for post-hospital care for this patient are outpatient vs residential CD treatment    Santiago Kim MD

## 2022-06-15 NOTE — PHARMACY-ADMISSION MEDICATION HISTORY
Admission Medication History Completed by Pharmacy    See [facility] Admission Navigator for allergy information, preferred outpatient pharmacy, prior to admission medications and immunization status.     Medication History Sources:     Fill History from [facility]    Changes made to [facility] medication list (reason):    Added:  o Daily-Luba Tabs - Take 1 tab PO every day   o Amlodipine 2.5 mg Tabs - Take 1 tab PO every day   o Thiamine 100 mg Tabs - Take 1 tab PO every day   o Diclofenac 1% Gel - Apply 2 gm to skin on knee and shoulder four times daily  o Potassium Chloride ER 10 mEq Tabs - Take 20 mEq PO BID  o Magnesium Oxide 400 mg Tabs - Take 800 mg PO BID  o Gabapentin 600 mg Tabs - Take 1 tab PO every day at midday and take 2 tabs PO at bedtime  o Ferrous Sulfate - Take 1 tab PO with breakfast   o Melatonin - Take 3 mg PO at bedtime  o Capsaicin 0.025% Cream - Apply thin layer topically to knee and shoulder three times daily  o Acetaminophen 325 mg tabs - Take 650 mg PO Q6H PRN mild pain  o Lubricating Eye Drops 0.4-0.3% Ophthalmic Solution - Place 1 drop into the left eye QID PRN dry eyes  o Nystatin 527178 unit/gm Powder - Apply to areas of redness underneath pants twice daily as needed    Deleted:   o Ibuprofen 600 mg tabs - Take 1 tab PO Q6H PRN moderate pain  o Thera-Vit-M Tabs - Take 1 tab PO every day     Changed:  o Venlafaxine 75 mg caps, take 75 mg PO every day -> Venlafaxine 150 mg caps, take 300 mg PO every day  o Fluticasone 27.5 mcg/spray -> Fluticasone 50 mcg/spray   o Calcium-vitamin D 600-400 mg-unit tabs, take 1 tab PO BID -> Calcium-vitamin D 500-400 mg-unit tabs, take 2 tabs PO BID    Additional Information:        Prior to Admission medications    Medication Sig Last Dose Taking? Auth Provider Long Term End Date   acetaminophen (TYLENOL) 325 MG tablet Take 650 mg by mouth every 6 hours as needed for mild pain  Yes Unknown, Entered By History     amLODIPine (NORVASC) 2.5 MG tablet Take 2.5 mg by  mouth daily  Yes Unknown, Entered By History Yes    Calcium Carb-Cholecalciferol (CALCIUM-VITAMIN D) 500-400 MG-UNIT TABS Take 2 tablets by mouth 2 times daily  Yes Unknown, Entered By History     capsaicin (ZOSTRIX) 0.025 % external cream Apply thin layer topically to knee and shoulder three times daily  Yes Unknown, Entered By History     cetirizine (ZYRTEC) 10 MG tablet Take 10 mg by mouth daily  Yes Reported, Patient     CYANOCOBALAMIN PO Take 1,000 mcg by mouth daily  Yes Reported, Patient     diclofenac (VOLTAREN) 1 % topical gel Apply 2 gm to skin on knee and shoulder four times daily  Yes Unknown, Entered By History     ferrous sulfate (FEROSUL) 325 (65 Fe) MG tablet Take 325 mg by mouth daily (with breakfast)  Yes Unknown, Entered By History     fluticasone (FLONASE) 50 MCG/ACT nasal spray Spray 1 spray into both nostrils daily  Yes Unknown, Entered By History     gabapentin (NEURONTIN) 600 MG tablet Take 1 tablet by mouth daily at midday and take 2 tablets by mouth at bedtime  Yes Unknown, Entered By History Yes    magnesium oxide (MAG-OX) 400 MG tablet Take 800 mg by mouth 2 times daily  Yes Unknown, Entered By History     melatonin 3 MG tablet Take 3 mg by mouth At Bedtime  Yes Unknown, Entered By History     Multiple Vitamin (DAILY-TITA) TABS Take 1 tablet by mouth daily  Yes Unknown, Entered By History     naltrexone (DEPADE/REVIA) 50 MG tablet Take 50 mg by mouth daily  Yes Reported, Patient Yes    nystatin (MYCOSTATIN) 464237 UNIT/GM external powder Apply to areas of redness underneath pants twice daily as needed  Yes Unknown, Entered By History     polyethylene glycol-propylene glycol (LUBRICATING EYE DROPS) 0.4-0.3 % SOLN ophthalmic solution Place 1 drop Into the left eye 4 times daily as needed for dry eyes  Yes Unknown, Entered By History     potassium chloride ER (KLOR-CON M) 10 MEQ CR tablet Take 20 mEq by mouth 2 times daily  Yes Unknown, Entered By History     thiamine (B-1) 100 MG tablet  Take 100 mg by mouth daily  Yes Unknown, Entered By History     venlafaxine (EFFEXOR XR) 150 MG 24 hr capsule Take 300 mg by mouth daily  Yes Reported, Patient Yes    venlafaxine (EFFEXOR-ER) 75 MG TB24 24 hr tablet Take 1 tablet (75 mg) by mouth daily  Patient not taking: Reported on 6/14/2022 Not Taking  Arielle Lennon MD Yes      Date completed: 06/14/22    Medication history completed by: Niurka Power

## 2022-06-15 NOTE — PROGRESS NOTES
MSSA score of 4, pt required no medication for alcohol withdrawal and is  observed to sleep throughout the noc when not engaged in cares.

## 2022-06-16 VITALS
TEMPERATURE: 97.3 F | DIASTOLIC BLOOD PRESSURE: 81 MMHG | HEART RATE: 89 BPM | OXYGEN SATURATION: 97 % | WEIGHT: 187 LBS | SYSTOLIC BLOOD PRESSURE: 126 MMHG | HEIGHT: 65 IN | RESPIRATION RATE: 16 BRPM | BODY MASS INDEX: 31.16 KG/M2

## 2022-06-16 LAB
MAGNESIUM SERPL-MCNC: 1.3 MG/DL (ref 1.6–2.3)
POTASSIUM BLD-SCNC: 3.6 MMOL/L (ref 3.4–5.3)

## 2022-06-16 PROCEDURE — 84132 ASSAY OF SERUM POTASSIUM: CPT | Performed by: PHYSICIAN ASSISTANT

## 2022-06-16 PROCEDURE — 250N000013 HC RX MED GY IP 250 OP 250 PS 637: Performed by: PSYCHIATRY & NEUROLOGY

## 2022-06-16 PROCEDURE — 83735 ASSAY OF MAGNESIUM: CPT | Performed by: PHYSICIAN ASSISTANT

## 2022-06-16 PROCEDURE — 36415 COLL VENOUS BLD VENIPUNCTURE: CPT | Performed by: PHYSICIAN ASSISTANT

## 2022-06-16 PROCEDURE — 250N000013 HC RX MED GY IP 250 OP 250 PS 637: Performed by: PHYSICIAN ASSISTANT

## 2022-06-16 PROCEDURE — 99239 HOSP IP/OBS DSCHRG MGMT >30: CPT | Performed by: PSYCHIATRY & NEUROLOGY

## 2022-06-16 RX ORDER — GABAPENTIN 600 MG/1
600 TABLET ORAL
Status: DISCONTINUED | OUTPATIENT
Start: 2022-06-16 | End: 2022-06-16 | Stop reason: HOSPADM

## 2022-06-16 RX ORDER — MAGNESIUM OXIDE 400 MG/1
800 TABLET ORAL 3 TIMES DAILY
Qty: 10 TABLET | Refills: 0 | Status: SHIPPED | OUTPATIENT
Start: 2022-06-16 | End: 2022-06-18

## 2022-06-16 RX ADMIN — THIAMINE HCL TAB 100 MG 100 MG: 100 TAB at 08:40

## 2022-06-16 RX ADMIN — GABAPENTIN 600 MG: 600 TABLET, FILM COATED ORAL at 12:04

## 2022-06-16 RX ADMIN — NALTREXONE HYDROCHLORIDE 50 MG: 50 TABLET, FILM COATED ORAL at 08:40

## 2022-06-16 RX ADMIN — AMLODIPINE BESYLATE 2.5 MG: 2.5 TABLET ORAL at 08:40

## 2022-06-16 RX ADMIN — CYANOCOBALAMIN TAB 1000 MCG 1000 MCG: 1000 TAB at 08:41

## 2022-06-16 RX ADMIN — Medication 800 MG: at 08:40

## 2022-06-16 RX ADMIN — CALCIUM CARBONATE 600 MG (1,500 MG)-VITAMIN D3 400 UNIT TABLET 1 TABLET: at 08:41

## 2022-06-16 RX ADMIN — Medication 25 MCG: at 08:40

## 2022-06-16 RX ADMIN — FERROUS SULFATE TAB 325 MG (65 MG ELEMENTAL FE) 325 MG: 325 (65 FE) TAB at 08:40

## 2022-06-16 RX ADMIN — FOLIC ACID 1 MG: 1 TABLET ORAL at 08:41

## 2022-06-16 RX ADMIN — MULTIPLE VITAMINS W/ MINERALS TAB 1 TABLET: TAB at 08:41

## 2022-06-16 RX ADMIN — VENLAFAXINE HYDROCHLORIDE 150 MG: 150 CAPSULE, EXTENDED RELEASE ORAL at 08:40

## 2022-06-16 ASSESSMENT — ACTIVITIES OF DAILY LIVING (ADL)
DRESS: SCRUBS (BEHAVIORAL HEALTH)
HYGIENE/GROOMING: INDEPENDENT
LAUNDRY: WITH SUPERVISION
ORAL_HYGIENE: INDEPENDENT

## 2022-06-16 NOTE — PROGRESS NOTES
Pt scored <8 on MSSA for >24 hours.  She has not required medication for withdrawal for >24 hours.  Pt has been removed from detox status per unit protocol.Aury Dubois RN

## 2022-06-16 NOTE — PROGRESS NOTES
Brief Medicine Note    Medicine following for hypokalemia, hypomagnesemia.     Hypokalemia now resolved. K is 3.6 this morning.     Hypomagnesemia is improving on oral supplementation.     Plan:   - No need for potassium supplementation today   - Continue PO magnesium oxide 800 mg TID   - Recheck magnesium level in AM     Medicine will follow repeat magnesium level in the morning. Please do not hesitate to contact if new questions or concerns arise.     Addendum: Notified by RN that patient will be discharging today. She should continue oral magnesium oxide for an additional 6 doses and have labs repeated within 1 week with PCP. Discharge orders placed.     Iman Baca PA-C  Hospitalist Service  Pager: [phone]

## 2022-06-16 NOTE — PLAN OF CARE
Goal Outcome Evaluation:    Plan of Care Reviewed With: patient     Overall Patient Progress: improving    Outcome Evaluation: Pt is out of detox monitoring and is asking for discharge today.    Pt denies anxiety and depression. Denies suicidal ideation plans or intent. Does report she would like to discharge today. Reports will take the bus to her residence in [place].   Pt not exhibiting any s/s of alcohol withdrawal and is out of detox. Appetite is good, no sweating or tremors witnessed.     Encouraged pt to schedule a follow up with her pcp prior to discharge. Informed pt that she should follow up with the PCP within 1-2 weeks. She reports she knows the number and will set this up on return home.     Lamar Durand, APRN, CNP    [address]    [place] [address]    Phone: [phone]    Fax: [phone]      Will continue to monitor and assess as warranted.

## 2022-06-16 NOTE — DISCHARGE SUMMARY
Chief Complaint:     Patient was admitted for alcohol dependence        Hospital Course:     Patient was admitted and observed. She was placed on Valium protocol for withdrawal. She tolerated this well and detoxed wthout complication. She was otherwise continued on her PTA medications. She consistently denied depression or suicidal thoughts throughout the admission. By day of discharge she was calm and cooperative with no suicidal or homicidal thoughts or evidence of withdrawal.            HPI:     Patient reports problems with alcohol dating back to age 19. Her longest sobriety was for 6 months when she was in a sober living. She has been in treatment numerous times.     Patient states that she has been drinking daily recently, and uses a quarter liter of hard liquor a day. Her last drink was yesterday.    Patient is treated for depression with Effexor. She feels that this is helpful. This is prescribed by her PMD. She has one admission as a teen for suicidality. As an adult she has never had a mental health admission and denies problems with suicidal thoughts or attempts    Patient states that she has never has had DTs but has had alcohol withdrawal hallucinations a couple of weeks ago that including visual hallucinations. She does not have current hallucinations        Past Psychiatric History:     Patient is treated for depression with Effexor. She feels that this is helpful. This is prescribed by her PMD. She has one admission as a teen for suicidality. As an adult she has never had a mental health admission and denies problems with suicidal thoughts or attempts          Substance Use and History:   Patient reports problems with alcohol dating back to age 19. Her longest sobriety was for 6 months when she was in a sober living. She has been in treatment numerous times.          Past Medical History:   PAST MEDICAL HISTORY:   Past Medical History:   Diagnosis Date     Alcohol dependence with withdrawal (H)      multiple detox attempts     Osteoarthritis of right knee      Seizures (H)     alcohol withdrawal     Substance abuse        PAST SURGICAL HISTORY:   Past Surgical History:   Procedure Laterality Date     GALLBLADDER SURGERY       GYN SURGERY               Family History:   FAMILY HISTORY: No family history on file.    Patient reports that alcohol problems run in her family but she can't specify      Social History:   Please see the full psychosocial profile from the clinical treatment coordinator.   SOCIAL HISTORY:   Social History     Tobacco Use     Smoking status: Current Every Day Smoker     Packs/day: 0.25     Smokeless tobacco: Never Used   Substance Use Topics     Alcohol use: Yes     Comment: daily     Patient has 4 children and she has regular contact with 2 of them.  She has a boyfriend of 3 years.   She lives with a roommate         PTA Medications:     Medications Prior to Admission   Medication Sig Dispense Refill Last Dose     acetaminophen (TYLENOL) 325 MG tablet Take 650 mg by mouth every 6 hours as needed for mild pain    at PRN     amLODIPine (NORVASC) 2.5 MG tablet Take 2.5 mg by mouth daily   6/13/2022     Calcium Carb-Cholecalciferol (CALCIUM-VITAMIN D) 500-400 MG-UNIT TABS Take 2 tablets by mouth 2 times daily   6/13/2022     capsaicin (ZOSTRIX) 0.025 % external cream Apply thin layer topically to knee and shoulder three times a day as needed    at PRN     cetirizine (ZYRTEC) 10 MG tablet Take 10 mg by mouth daily   6/13/2022     ferrous sulfate (FEROSUL) 325 (65 Fe) MG tablet Take 325 mg by mouth daily (with breakfast)   6/13/2022     fluticasone (FLONASE) 50 MCG/ACT nasal spray Spray 1 spray into both nostrils daily as needed for allergies    at PRN     gabapentin (NEURONTIN) 600 MG tablet Take 1 tablet by mouth daily at midday and take 2 tablets by mouth at bedtime   6/13/2022     magnesium oxide (MAG-OX) 400 MG tablet Take 800 mg by mouth 2 times daily   6/13/2022     melatonin 3 MG  tablet Take 3 mg by mouth At Bedtime   6/13/2022     Multiple Vitamin (DAILY-TITA) TABS Take 1 tablet by mouth daily   6/13/2022     naltrexone (DEPADE/REVIA) 50 MG tablet Take 50 mg by mouth daily   6/13/2022     polyethylene glycol-propylene glycol (LUBRICATING EYE DROPS) 0.4-0.3 % SOLN ophthalmic solution Place 1 drop Into the left eye 4 times daily as needed for dry eyes   6/13/2022     potassium chloride ER (KLOR-CON M) 10 MEQ CR tablet Take 20 mEq by mouth 2 times daily   6/13/2022     thiamine (B-1) 100 MG tablet Take 100 mg by mouth daily   6/13/2022     venlafaxine (EFFEXOR XR) 150 MG 24 hr capsule Take 300 mg by mouth daily   6/13/2022     Vitamin D3 (CHOLECALCIFEROL) 25 mcg (1000 units) tablet Take 25 mcg by mouth daily   6/13/2022            Current Medications:       amLODIPine  2.5 mg Oral Daily     calcium carbonate 600 mg-vitamin D 400 units  1 tablet Oral BID     cyanocobalamin  1,000 mcg Oral Daily     ferrous sulfate  325 mg Oral Daily with breakfast     folic acid  1 mg Oral Daily     magnesium oxide  800 mg Oral TID     multivitamin w/minerals  1 tablet Oral Daily     naltrexone  50 mg Oral Daily     sodium chloride (PF)  3 mL Intracatheter Q8H     thiamine  100 mg Oral Daily     venlafaxine  150 mg Oral Daily     Vitamin D3  25 mcg Oral Daily     atenolol, diazepam, loperamide, nicotine, ondansetron         Allergies:     Allergies   Allergen Reactions     Acetaminophen      Penicillins Other (See Comments)     Doesn't know     Percocet [Oxycodone-Acetaminophen]           Labs:     Recent Results (from the past 48 hour(s))   Potassium    Collection Time: 06/14/22 10:22 AM   Result Value Ref Range    Potassium 3.0 (L) 3.4 - 5.3 mmol/L   Magnesium    Collection Time: 06/14/22 10:22 AM   Result Value Ref Range    Magnesium 0.6 (LL) 1.6 - 2.3 mg/dL   Extra Purple Top Tube    Collection Time: 06/14/22 10:22 AM   Result Value Ref Range    Hold Specimen JIC    GGT    Collection Time: 06/15/22  6:35 AM  "  Result Value Ref Range    GGT 1,216 (H) 0 - 40 U/L   Lipid panel    Collection Time: 06/15/22  6:35 AM   Result Value Ref Range    Cholesterol 241 (H) <200 mg/dL    Triglycerides 124 <150 mg/dL    Direct Measure  >=50 mg/dL    LDL Cholesterol Calculated 112 (H) <=100 mg/dL    Non HDL Cholesterol 137 (H) <130 mg/dL   TSH with free T4 reflex and/or T3 as indicated    Collection Time: 06/15/22  6:35 AM   Result Value Ref Range    TSH 3.40 0.40 - 4.00 mU/L   Vitamin B12    Collection Time: 06/15/22  6:35 AM   Result Value Ref Range    Vitamin B12 643 193 - 986 pg/mL   Folate    Collection Time: 06/15/22  6:35 AM   Result Value Ref Range    Folic Acid 30.1 >=5.4 ng/mL   Magnesium    Collection Time: 06/15/22  6:35 AM   Result Value Ref Range    Magnesium 1.0 (L) 1.6 - 2.3 mg/dL   Potassium    Collection Time: 06/15/22  6:35 AM   Result Value Ref Range    Potassium 3.0 (L) 3.4 - 5.3 mmol/L   Magnesium    Collection Time: 06/16/22  6:50 AM   Result Value Ref Range    Magnesium 1.3 (L) 1.6 - 2.3 mg/dL   Potassium    Collection Time: 06/16/22  6:50 AM   Result Value Ref Range    Potassium 3.6 3.4 - 5.3 mmol/L          Physical Exam:     /81   Pulse 89   Temp 97.3  F (36.3  C) (Temporal)   Resp 16   Ht 1.651 m (5' 5\")   Wt 84.8 kg (187 lb)   SpO2 97%   BMI 31.12 kg/m    Weight is 187 lbs 0 oz  Body mass index is 31.12 kg/m .    Physical Exam:  Gen: No acute distress  HEENT: EOMI, no nystagmus or scleral icterus, moist mucous membranes  Skin: No diaphoresis or rash           Physical ROS:   The patient endorsed GI upset from withdrawal and knee/foot pain which are chronic for her.     Review of Systems is otherwise negative including HEENT, CV, Respiratory, GI, , Musculoskeletal, Neurologic, Dermatologic, Endocrine, Immunological, Constitutional systems                 Mental Status Exam:     Mental Status  Patient is casually dressed  Hygiene good  Speech fluent  Thought Process concrete  Thought " Content:  No suicidal ideation,    No homicidal ideation,   No ideas of reference,    No loose associations,    No auditory hallucinations,     No visual hallucinations   No delusions  Psychomotor: No agitation or slowing  Cognition:  Alert and oriented to time place and person  Attention good  Concentration fair  Memory normal including recent and remote memory  Mood:  euthymic  Affect: mood congruent  Judgement: normal  Eye contact good  Cooperation good  Language normal  Fund of knowledge normal  Musculoskeletal normal gait with no abnormal movements  Minimal change in mental status in the past 24 hours           Admission Diagnoses:   Acute alcoholic intoxication in alcoholism without complication (H)    Patient Active Problem List    Diagnosis Date Noted     Acute alcoholic intoxication in alcoholism without complication (H) 06/14/2022     Priority: Medium     Major depression in partial remission (H) 06/14/2022     Priority: Medium     Hypernatremia 07/01/2018     Priority: Medium     Alcohol dependence (H) 02/23/2015     Priority: Medium     Alcohol addiction (H) 08/15/2013     Priority: Medium              Assessment:     Patient presents with alcohol dependence. She is on Effexor for depression but may need to go up on dose gradually considering withdrawal symptoms especially GI upset         Plan:     Discharge to home    Medication:    amLODIPine  2.5 mg Oral Daily     calcium carbonate 600 mg-vitamin D 400 units  1 tablet Oral BID     cyanocobalamin  1,000 mcg Oral Daily     ferrous sulfate  325 mg Oral Daily with breakfast     folic acid  1 mg Oral Daily     magnesium oxide  800 mg Oral TID     multivitamin w/minerals  1 tablet Oral Daily     naltrexone  50 mg Oral Daily     sodium chloride (PF)  3 mL Intracatheter Q8H     thiamine  100 mg Oral Daily     venlafaxine  150 mg Oral Daily     Vitamin D3  25 mcg Oral Daily     Will anticipate increasing Effexor back to 300 mg a day at home      Consults:  Hospitalist will be consulted if medical issues arise      Multidisciplinary Interventions:  to gather collateral information, coordinate care with outpatient providers and begin follow up planning      Disposition: assess options for CD treatment      More than 35 minutes spent on this visit with more than 50% time spent on coordination of care with staff, reviewing medical record, psychoeducation, providing supportive therapy regarding coping with chronic mental illness, entering orders and preparing documentation for the visit      Santiago Kim MD

## 2022-06-16 NOTE — PLAN OF CARE
Problem: Plan of Care - These are the overarching goals to be used throughout the patient stay.    Goal: Optimal Comfort and Wellbeing  Outcome: Ongoing, Progressing       Problem: Behavioral Health Plan of Care  Goal: Optimal Comfort and Wellbeing  Outcome: Ongoing, Progressing    Behavioral  Pt appeared sleeping comfortably overnight; breathing was even and unlabored.      Medical  Pt is out of detox from alcohol.  No new medical concerns noted.

## 2022-06-16 NOTE — PROGRESS NOTES
Pt given copy of their discharge instructions and medication administration instructions. All discharge plans and labs were discussed with patient. Pt reports no questions at this time regarding discharge plans, labs or medications. Pt denies any suicidal ideation, plans or intent at this time. Patient discharge assisted via [facility] directly to the [facility]. Patient plan is to return home. She was instructed on the importance of making and attending a follow up appointment within 7 days for lab recheck and states she will do so after she returns home. Patient is discharged at this time.

## 2022-06-16 NOTE — PLAN OF CARE
Problem: Alcohol Withdrawal  Goal: Alcohol Withdrawal Symptom Control  Outcome: Ongoing, Progressing      Patient's affect remaied flat and blunted and this shift.  She is been monitored for Alcohol withdrawal. She denied anxiety, depression and all alcohol withdrawal symptoms  MSSA score was 5 and  she did not receive any valium this shift.  Potassium and Mag levels are still running low and she is on supplements. Her heart rate is still running tachy 103.Atenolol given with maximum effect.   Patient's plan is to go home after discharge, she denies plans for following with treatment. Will continue to monitor and support as needed.Aury Dubois RN

## 2022-06-17 ENCOUNTER — PATIENT OUTREACH (OUTPATIENT)
Dept: CARE COORDINATION | Facility: CLINIC | Age: 57
End: 2022-06-17
Payer: COMMERCIAL

## 2022-06-17 DIAGNOSIS — Z71.89 OTHER SPECIFIED COUNSELING: ICD-10-CM

## 2022-06-17 NOTE — PROGRESS NOTES
Clinic Care Coordination Contact    Background: Care Coordination referral placed from [facility] discharge report for reason of patient meeting criteria for a TCM outreach call by [facility] team.    Assessment: Upon chart review, [facility] Team member will cancel/close the referral for TCM outreach due to reason below:    Patient has a follow up appointment with an appropriate provider today for hospital discharge    Plan: Care Coordination referral for TCM outreach canceled.    LEO Stephens  [facility], [facility]  
Detail Level: Simple
Detail Level: Detailed
Detail Level: Zone

## 2024-04-08 ENCOUNTER — LAB REQUISITION (OUTPATIENT)
Dept: LAB | Facility: CLINIC | Age: 59
End: 2024-04-08

## 2024-04-08 PROCEDURE — 88342 IMHCHEM/IMCYTCHM 1ST ANTB: CPT | Mod: TC | Performed by: PATHOLOGY
